# Patient Record
Sex: MALE
[De-identification: names, ages, dates, MRNs, and addresses within clinical notes are randomized per-mention and may not be internally consistent; named-entity substitution may affect disease eponyms.]

---

## 2018-11-22 ENCOUNTER — HOSPITAL ENCOUNTER (EMERGENCY)
Dept: HOSPITAL 88 - FSED | Age: 73
Discharge: HOME | End: 2018-11-22
Payer: MEDICARE

## 2018-11-22 VITALS — WEIGHT: 185 LBS | HEIGHT: 65 IN | BODY MASS INDEX: 30.82 KG/M2

## 2018-11-22 DIAGNOSIS — I10: ICD-10-CM

## 2018-11-22 DIAGNOSIS — S63.125A: ICD-10-CM
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
## 2018-11-22 DIAGNOSIS — S61.012A: Primary | ICD-10-CM

## 2018-11-22 DIAGNOSIS — F17.210: ICD-10-CM

## 2018-11-22 PROCEDURE — 99284 EMERGENCY DEPT VISIT MOD MDM: CPT

## 2018-11-22 NOTE — DIAGNOSTIC IMAGING REPORT
Exam:  Left Hand Series.



History: Laceration to the left thumb.

 

Comparison: None.



Findings: 4 views of the left hand. There is decreased bone mineralization. 

Dislocation of the first finger at the interphalangeal joint, with dorsal

displacement of the distal phalanx. No definite acute, displaced fracture. 

Moderate degenerative changes in the first carpometacarpal joint. Other joint

spaces are relatively preserved.

No lytic or blastic lesion.

No abnormal soft tissue calcification or mass.  No cystic erosive changes.Soft

tissue swelling in the first finger. No soft tissue defect is noted.



Impression:



1. Dislocation of the first finger and the interphalangeal joint, with dorsal

displacement of the distal phalanx. No definite acute displaced fracture. No

soft tissue defect is noted.



2. Findings in the first carpometacarpal joint likely reflect osteoarthritis.



Signed by: Dr. Darrius Logan M.D. on 11/22/2018 3:52 PM

## 2018-11-22 NOTE — DIAGNOSTIC IMAGING REPORT
Exam:  Finger, AP and lateral view



History:  Status post reduction



Comparison: Hand films 11/22/2018



Findings: There is decreased  bone mineralization. Interval reduction of

previously visualized dislocation of the first finger at the interphalangeal

joint, with satisfactory alignment.

There is an oblique linear lucency at the radial aspect of the base of the

distal phalanx, likely representing a nondisplaced fracture. Moderate

degenerative changes in the first carpometacarpal joint. No abnormal soft

tissue calcification or soft tissue defect.  Soft tissue swelling.



Impression:



1. Interval reduction of previously visualized dislocation of the first finger

and the interphalangeal joint, with satisfactory alignment.



2. Oblique linear lucency at the radial aspect of the base of the distal

phalanx likely represents a nondisplaced fracture.



Signed by: Dr. Darrius Logan M.D. on 11/22/2018 4:36 PM

## 2019-10-05 ENCOUNTER — HOSPITAL ENCOUNTER (EMERGENCY)
Dept: HOSPITAL 88 - FSED | Age: 74
Discharge: HOME | End: 2019-10-05
Payer: MEDICARE

## 2019-10-05 VITALS — DIASTOLIC BLOOD PRESSURE: 91 MMHG | SYSTOLIC BLOOD PRESSURE: 159 MMHG

## 2019-10-05 VITALS — HEIGHT: 65 IN | WEIGHT: 170.06 LBS | BODY MASS INDEX: 28.33 KG/M2

## 2019-10-05 DIAGNOSIS — R11.0: ICD-10-CM

## 2019-10-05 DIAGNOSIS — C25.9: ICD-10-CM

## 2019-10-05 DIAGNOSIS — R10.84: ICD-10-CM

## 2019-10-05 DIAGNOSIS — R10.13: Primary | ICD-10-CM

## 2019-10-05 PROCEDURE — 81003 URINALYSIS AUTO W/O SCOPE: CPT

## 2019-10-05 PROCEDURE — 80048 BASIC METABOLIC PNL TOTAL CA: CPT

## 2019-10-05 PROCEDURE — 85025 COMPLETE CBC W/AUTO DIFF WBC: CPT

## 2019-10-05 PROCEDURE — 80076 HEPATIC FUNCTION PANEL: CPT

## 2019-10-05 PROCEDURE — 99284 EMERGENCY DEPT VISIT MOD MDM: CPT

## 2019-10-05 PROCEDURE — 84484 ASSAY OF TROPONIN QUANT: CPT

## 2019-10-05 PROCEDURE — 74177 CT ABD & PELVIS W/CONTRAST: CPT

## 2019-10-05 NOTE — XMS REPORT
Patient Summary Document

                             Created on: 10/05/2019



VIPUL SIMON

External Reference #: [id number]

: 1945

Sex: Male



Demographics







                          Address                   [address]  [address]

 

                          Home Phone                [phone]

 

                          Preferred Language        Unknown

 

                          Marital Status            Unknown

 

                          Catholic Affiliation     Unknown

 

                          Race                      Unknown

 

                          Ethnic Group              Unknown





Author







                          Author                    [facility]

 

                          [facility]

 

                          Address                   Unknown

 

                          Phone                     Unavailable







Care Team Providers







                    Care Team Member Name    Role                Phone

 

                    JEAN CLAUDE ANDERSON     Unavailable         Unavailable







Problems

This patient has no known problems.



Allergies, Adverse Reactions, Alerts

This patient has no known allergies or adverse reactions.



Medications

This patient has no known medications.



Results







           Test Description    Test Time    Test Comments    Text Results    Atomic Results    Result

 Comments

 

                FINGER LT - HOPD    2018 16:27:00                                                          

                                                [place] [address]      Patient Name: VIPUL SIMON                                   MR
#: [id number]                     : 1945                               
   Age/Sex: 73/M  Acct #: [id number]                              Req #: [id number]
[id number]  [facility] Physician:                                                      
Ordered by: VICTOR M ANDERSON MD                            Report #: [id number]   
    Location: [facility]                                    Room/Bed:                 
   
___________________________________________________________________________________________________
   Procedure: [id number] HOPD/FINGER LT - HOPD  Exam Date: 18              
             Exam Time: 1606                                              REPORT
STATUS: Signed       Exam:  Finger, AP and lateral view      History:  Status 
post reduction      Comparison: Hand films 2018      Findings: There is 
decreased  bone mineralization. Interval reduction of   previously visualized 
dislocation of the first finger at the interphalangeal   joint, with 
satisfactory alignment.   There is an oblique linear lucency at the radial 
aspect of the base of the   distal phalanx, likely representing a nondisplaced 
fracture. Moderate   degenerative changes in the first carpometacarpal joint. No
abnormal soft   tissue calcification or soft tissue defect.  Soft tissue 
swelling.      Impression:      1. Interval reduction of previously visualized 
dislocation of the first finger   and the interphalangeal joint, with 
satisfactory alignment.      2. Oblique linear lucency at the radial aspect of 
the base of the distal   phalanx likely represents a nondisplaced fracture.     
Signed by: Dr. Ninoska Logan M.D. on 2018 4:36 PM        Dictated By:
NINOSKA LOGAN MD  Electronically Signed By: NINOSKA LOGAN MD on 18 
Transcribed By: HARI on 18       COPY TO:   VICTOR M ANDERSON MD     
                                         

 

                HAND 3 VIEW [facility]    2018 15:49:00                                                     

                                                     [place] [address]      Patient Name: VIPUL SIMON                                   MR
#: [id number]                     : 1945                               
   Age/Sex: 73/M  Acct #: [id number]                              Req #: [id number]
[id number]  Adm Physician:                                                      
Ordered by: VICTOR M ANDERSON MD                            Report #: [id number]   
    Location: [facility]                                    Room/Bed:                 
   
___________________________________________________________________________________________________
   Procedure: [id number] HOPD/HAND 3 VIEW  - [facility] [place]  Exam Date: 18         
                  Exam Time: 1538                                              
REPORT STATUS: Signed       Exam:  Left Hand Series.      History: Laceration to
the left thumb.       Comparison: None.      Findings: 4 views of the left hand.
There is decreased bone mineralization.    Dislocation of the first finger at 
the interphalangeal joint, with dorsal   displacement of the distal phalanx. No 
definite acute, displaced fracture.    Moderate degenerative changes in the 
first carpometacarpal joint. Other joint   spaces are relatively preserved.   No
lytic or blastic lesion.   No abnormal soft tissue calcification or mass.  No 
cystic erosive changes.Soft   tissue swelling in the first finger. No soft 
tissue defect is noted.      Impression:      1. Dislocation of the first finger
and the interphalangeal joint, with dorsal   displacement of the distal phalanx.
No definite acute displaced fracture. No   soft tissue defect is noted.      2. 
Findings in the first carpometacarpal joint likely reflect osteoarthritis.      
Signed by: Dr. Ninoska Logan M.D. on 2018 3:52 PM        Dictated By:
NINOSKA LOGAN MD  Electronically Signed By: NINOSKA LOGAN MD on 18 
Transcribed By: HARI on 18       COPY TO:   VICTOR M ANDERSON MD

## 2019-10-05 NOTE — DIAGNOSTIC IMAGING REPORT
EXAM: CT Abdomen and Pelvis WITH contrast  

INDICATION:      

^[id number]

^1550    



COMPARISON: None.

TECHNIQUE: Abdomen and pelvis were scanned utilizing a multidetector helical

scanner from the lung base to the pubic symphysis after administration of IV

contrast. Coronal and sagittal reformations were obtained. Routine protocol was

performed. Scan was performed when during portal venous phase.

            IV CONTRAST: 100 mL of Isovue-370

            ORAL CONTRAST: Water

            RADIATION DOSE: Total DLP: 743.7 mGy*cm

             Estimated effective dose: (DLP x 0.015 x size factor) mSv

            COMPLICATIONS: None



FINDINGS:



LINES and TUBES: None.



LOWER THORAX:  Moderate coronary artery calcifications.



HEPATOBILIARY:      No focal hepatic lesions. No biliary ductal dilation. 



GALLBLADDER: Multiple small gallstones.  No wall thickening.



SPLEEN: No splenomegaly. 



PANCREAS: 2.0 x 1.7 cm low-attenuation mass within the pancreatic head on

series 2, image 32. There is severe atrophy of the pancreatic body and tail

with associated diffuse dilatation of the pancreatic duct, measuring up to 0.8

cm in diameter. There is extensive fat stranding surrounding the pancreatic

head better seen on series 2, image 37.  



ADRENALS: No adrenal nodules    



KIDNEYS/URETERS: Kidneys enhance symmetrically.  No hydronephrosis. No cystic

or solid mass lesions.  No stones.



GI TRACT: No abnormal distention, wall thickening, or evidence of bowel

obstruction.  Extensive diverticulosis throughout the sigmoid colon. 

Appendectomy.



PELVIC ORGANS/BLADDER: Unremarkable.



LYMPH NODES: There are few aortocaval (series 2, image 31 and periportal lymph

nodes (series 2, image 26, measuring up to 0.8 cm in transverse diameter.



VESSELS: Atherosclerotic calcifications of the abdominal aorta and pelvic

arteries without aneurysm.



PERITONEUM / RETROPERITONEUM: No free air or fluid. There are multiple soft

tissue masses/implants throughout the abdomen and pelvis with the largest in

the right lower quadrant, underneath the anterior abdominal wall measuring 4.7

x 3.7 cm on series 2, image 53. Additional soft tissue mass is noted in the

left upper quadrant, anteriorly on series 2, image 43 measuring 3.5 x 1.9 cm.

Additional multiple lymph nodes in the right lower quadrant, for example on

series 2 image 56, measuring 2.2 cm.



BONES: Moderate multilevel degenerative changes of the lumbar spine.



SOFT TISSUES: Small fat-containing left inguinal hernia.            



IMPRESSION: 

A 2.0 x 1.7 cm low-attenuation pancreatic head mass with associated diffuse

atrophy of the pancreatic body and tail is highly concerning for malignancy.



Multiple soft tissue masses throughout the abdomen and pelvis with the largest

in the right anterior abdomen, measuring up to 4.7 cm, are indeterminate but

may reflect peritoneal implants.



Recommend GI consultation and MRI abdomen with and without contrast per

pancreatic mass protocol for further evaluation.



Extensive diverticulosis throughout the sigmoid colon without diverticulitis.



Signed by: Dr. Triny Cutler M.D. on 10/5/2019 5:21 PM

## 2020-01-31 ENCOUNTER — HOSPITAL ENCOUNTER (INPATIENT)
Dept: HOSPITAL 88 - FSED | Age: 75
LOS: 3 days | Discharge: HOME | DRG: 375 | End: 2020-02-03
Attending: INTERNAL MEDICINE | Admitting: INTERNAL MEDICINE
Payer: MEDICARE

## 2020-01-31 VITALS — HEIGHT: 65 IN | BODY MASS INDEX: 26.66 KG/M2 | WEIGHT: 160 LBS

## 2020-01-31 VITALS — DIASTOLIC BLOOD PRESSURE: 75 MMHG | SYSTOLIC BLOOD PRESSURE: 134 MMHG

## 2020-01-31 VITALS — SYSTOLIC BLOOD PRESSURE: 139 MMHG | DIASTOLIC BLOOD PRESSURE: 79 MMHG

## 2020-01-31 VITALS — SYSTOLIC BLOOD PRESSURE: 134 MMHG | DIASTOLIC BLOOD PRESSURE: 75 MMHG

## 2020-01-31 DIAGNOSIS — K21.9: ICD-10-CM

## 2020-01-31 DIAGNOSIS — I25.10: ICD-10-CM

## 2020-01-31 DIAGNOSIS — K57.90: ICD-10-CM

## 2020-01-31 DIAGNOSIS — C78.6: Primary | ICD-10-CM

## 2020-01-31 DIAGNOSIS — Z87.891: ICD-10-CM

## 2020-01-31 DIAGNOSIS — C25.9: ICD-10-CM

## 2020-01-31 DIAGNOSIS — R91.1: ICD-10-CM

## 2020-01-31 DIAGNOSIS — K80.20: ICD-10-CM

## 2020-01-31 DIAGNOSIS — K56.690: ICD-10-CM

## 2020-01-31 DIAGNOSIS — I10: ICD-10-CM

## 2020-01-31 PROCEDURE — 81003 URINALYSIS AUTO W/O SCOPE: CPT

## 2020-01-31 PROCEDURE — 96374 THER/PROPH/DIAG INJ IV PUSH: CPT

## 2020-01-31 PROCEDURE — 74177 CT ABD & PELVIS W/CONTRAST: CPT

## 2020-01-31 PROCEDURE — 99284 EMERGENCY DEPT VISIT MOD MDM: CPT

## 2020-01-31 PROCEDURE — 74018 RADEX ABDOMEN 1 VIEW: CPT

## 2020-01-31 PROCEDURE — 36415 COLL VENOUS BLD VENIPUNCTURE: CPT

## 2020-01-31 PROCEDURE — 80076 HEPATIC FUNCTION PANEL: CPT

## 2020-01-31 PROCEDURE — 83735 ASSAY OF MAGNESIUM: CPT

## 2020-01-31 PROCEDURE — 96375 TX/PRO/DX INJ NEW DRUG ADDON: CPT

## 2020-01-31 PROCEDURE — 83690 ASSAY OF LIPASE: CPT

## 2020-01-31 PROCEDURE — 85025 COMPLETE CBC W/AUTO DIFF WBC: CPT

## 2020-01-31 PROCEDURE — 80048 BASIC METABOLIC PNL TOTAL CA: CPT

## 2020-01-31 PROCEDURE — 84100 ASSAY OF PHOSPHORUS: CPT

## 2020-01-31 RX ADMIN — SODIUM CHLORIDE SCH MLS/HR: 9 INJECTION, SOLUTION INTRAVENOUS at 18:31

## 2020-01-31 RX ADMIN — SODIUM CHLORIDE PRN MG: 900 INJECTION INTRAVENOUS at 21:42

## 2020-01-31 NOTE — NUR
PT ARRIVED TO ROOM 203 WITH EMS VIA STRETCHER; PT AWAKE, ALERT, NO SIGNS OF DISTRESS, IV 
PATENT, INTACT, NG TUBE IN PLACE. PT HAS NO COMPLAINTS AT THIS TIME.

## 2020-01-31 NOTE — DIAGNOSTIC IMAGING REPORT
CT scan of the abdomen and pelvis.



Medical history: Pain, pancreatic cancer.



Comparison study: October 5, 2019.



Technique: Contiguous helical slices were acquired through the abdomen and

pelvis post administration of intravenous contrast. No oral contrast was

administered. This exam was performed according to our department dose

optimization program which includes automated exposure control, adjustment of

the mA and/or kV according to the patient's size and/or use of iterative

reconstruction technique.



Findings: A 6 mm nodule is seen in the right middle lobe on image 1, partially

visualized and for which metastatic disease cannot be excluded. Atelectasis or

fibrosis is seen in the lung bases.



The liver, spleen, adrenal glands and kidneys are unremarkable. Cholelithiasis

is seen with distention of the gallbladder measuring 4.2 cm in maximal

transverse diameter. No biliary dilatation is seen. The portal vein remains

patent measuring 1.5 cm.



A 2.2 x 2.3 cm mass is seen in the pancreatic head, similar to previous. There

are atrophic changes in the tail with significant dilatation of the duct

measuring up to 7 mm. The mass invades the superior mesenteric vein with near

complete occlusion. The portal venous confluence appears invaded. The lesion

abuts the superior mesenteric artery with no clear invasion.



There are multiple dilated small bowel loops measuring up to 4.0 cm consistent

with a small bowel obstruction. A transition is seen in the mid small bowel

slightly eccentric to the right side. The small bowel distal to this region is

decompressed. Stool and gas remaining in the colon. Extensive diverticulosis is

seen without evidence of diverticulitis. Mild ascites is seen, more pronounced

on previous.



As on previous, there is evidence of omental taking with multiple large omental

masses identified measuring up to 7.2 x 1.5 cm in maximal transverse diameter.

This nodule is slightly less pronounced on previous. However, other omental

sites extending to the mesentery are more prominent including a 5.7 x 2.3 cm

focus which was not previously present.



There is no free fluid or free air. The aorta is normal in caliber.

Atherosclerosis is identified. A duodenal diverticulum is noted.



Bone windows demonstrate degenerative changes.



Impression:

1. Pancreatic adenocarcinoma, as on previous. It invades several adjacent

structures.

2. Omental take as well as mesenteric lesions, some areas appear more

pronounced whereas others are less pronounced.

3. Small bowel obstruction with a transition in the mid small bowel to the

right of midline. Obstruction from a mesenteric implant cannot be excluded.

4. Diverticulosis.

5. Cholelithiasis. 

6. Tiny nodule in the right middle lobe, partially visualized and for which

metastatic disease cannot be excluded.



Signed by: Luis Owusu MD on 1/31/2020 3:45 PM

## 2020-01-31 NOTE — NUR
BEDSIDE SHIFT REPORT GIVEN TO NIGHT SHIFT RN; PT'S NG TUBE PATENT, INTACT. PT AWAKE, ALERT, 
NO SIGNS OF DISTRESS.

## 2020-02-01 VITALS — DIASTOLIC BLOOD PRESSURE: 78 MMHG | SYSTOLIC BLOOD PRESSURE: 144 MMHG

## 2020-02-01 VITALS — DIASTOLIC BLOOD PRESSURE: 72 MMHG | SYSTOLIC BLOOD PRESSURE: 120 MMHG

## 2020-02-01 VITALS — SYSTOLIC BLOOD PRESSURE: 123 MMHG | DIASTOLIC BLOOD PRESSURE: 66 MMHG

## 2020-02-01 VITALS — SYSTOLIC BLOOD PRESSURE: 115 MMHG | DIASTOLIC BLOOD PRESSURE: 66 MMHG

## 2020-02-01 VITALS — DIASTOLIC BLOOD PRESSURE: 63 MMHG | SYSTOLIC BLOOD PRESSURE: 121 MMHG

## 2020-02-01 VITALS — DIASTOLIC BLOOD PRESSURE: 65 MMHG | SYSTOLIC BLOOD PRESSURE: 119 MMHG

## 2020-02-01 VITALS — DIASTOLIC BLOOD PRESSURE: 66 MMHG | SYSTOLIC BLOOD PRESSURE: 115 MMHG

## 2020-02-01 LAB
ANION GAP SERPL CALC-SCNC: 16.5 MMOL/L (ref 8–16)
BASOPHILS # BLD AUTO: 0 10*3/UL (ref 0–0.1)
BASOPHILS NFR BLD AUTO: 0.3 % (ref 0–1)
BUN SERPL-MCNC: 16 MG/DL (ref 7–26)
BUN/CREAT SERPL: 22 (ref 6–25)
CALCIUM SERPL-MCNC: 8.2 MG/DL (ref 8.4–10.2)
CHLORIDE SERPL-SCNC: 103 MMOL/L (ref 98–107)
CO2 SERPL-SCNC: 20 MMOL/L (ref 22–29)
DEPRECATED NEUTROPHILS # BLD AUTO: 2.8 10*3/UL (ref 2.1–6.9)
EGFRCR SERPLBLD CKD-EPI 2021: > 60 ML/MIN (ref 60–?)
EOSINOPHIL # BLD AUTO: 0 10*3/UL (ref 0–0.4)
EOSINOPHIL NFR BLD AUTO: 0 % (ref 0–6)
ERYTHROCYTE [DISTWIDTH] IN CORD BLOOD: 14.6 % (ref 11.7–14.4)
GLUCOSE SERPLBLD-MCNC: 109 MG/DL (ref 74–118)
HCT VFR BLD AUTO: 33.2 % (ref 38.2–49.6)
HGB BLD-MCNC: 11.2 G/DL (ref 14–18)
LYMPHOCYTES # BLD: 0.6 10*3/UL (ref 1–3.2)
LYMPHOCYTES NFR BLD AUTO: 14.5 % (ref 18–39.1)
MCH RBC QN AUTO: 31.5 PG (ref 28–32)
MCHC RBC AUTO-ENTMCNC: 33.7 G/DL (ref 31–35)
MCV RBC AUTO: 93.5 FL (ref 81–99)
MONOCYTES # BLD AUTO: 0.4 10*3/UL (ref 0.2–0.8)
MONOCYTES NFR BLD AUTO: 11.6 % (ref 4.4–11.3)
NEUTS SEG NFR BLD AUTO: 73.3 % (ref 38.7–80)
PLATELET # BLD AUTO: 211 X10E3/UL (ref 140–360)
POTASSIUM SERPL-SCNC: 3.5 MMOL/L (ref 3.5–5.1)
RBC # BLD AUTO: 3.55 X10E6/UL (ref 4.3–5.7)
SODIUM SERPL-SCNC: 136 MMOL/L (ref 136–145)

## 2020-02-01 RX ADMIN — FAMOTIDINE SCH MG: 10 INJECTION, SOLUTION INTRAVENOUS at 16:16

## 2020-02-01 RX ADMIN — SODIUM CHLORIDE SCH MLS/HR: 9 INJECTION, SOLUTION INTRAVENOUS at 03:00

## 2020-02-01 RX ADMIN — SODIUM CHLORIDE SCH MLS/HR: 9 INJECTION, SOLUTION INTRAVENOUS at 08:14

## 2020-02-01 RX ADMIN — FAMOTIDINE SCH MG: 10 INJECTION, SOLUTION INTRAVENOUS at 09:28

## 2020-02-01 RX ADMIN — SODIUM CHLORIDE SCH MLS/HR: 9 INJECTION, SOLUTION INTRAVENOUS at 16:16

## 2020-02-01 RX ADMIN — SODIUM CHLORIDE PRN MG: 900 INJECTION INTRAVENOUS at 06:04

## 2020-02-01 NOTE — CONSULTATION
DATE OF CONSULTATION:  02/01/2020  

 

CHIEF COMPLAINT:  Vomiting.

 

HISTORY OF PRESENT ILLNESS:  The patient is a 74-year-old male with known history of

advanced pancreatic cancer, undergoing chemotherapy.  He complains of 1-week history of

intolerance of oral intake with repeated nausea and vomiting, and some abdominal

discomfort.  No fever or diarrhea.  Last bowel movement was yesterday. 

 

PAST MEDICAL HISTORY:  Significant for hypertension, coronary artery disease, and

gastroesophageal reflux disease. 

 

PAST SURGICAL HISTORY:  Positive for gastric surgery and appendectomy.

 

ALLERGIES:  TO PENICILLIN.

 

SOCIAL HABITS:  He denies smoking or alcohol use.

 

REVIEW OF SYSTEMS:

No chest pain, shortness of breath, or cough.

 

PHYSICAL EXAMINATION:

VITAL SIGNS:  Stable.  He is afebrile.  He is awake, alert, and mild discomfort. 

HEENT:  Sclerae nonicteric. 

NECK:  Supple. 

LUNGS:  Clear. 

HEART:  Regular rate and rhythm. 

ABDOMEN:  Soft.  No guarding.  No focal tenderness or rebound. 

EXTREMITIES:  No cyanosis or edema.

LABORATORY DATA:  The patient white cell count is 4, hemoglobin of 11, creatinine 0.7.

Lipase is less than 4.  CT of the abdomen show evidence of intestinal obstruction with

transition in the mid small bowel. 

 

ASSESSMENT:  Intestinal obstruction in patient with advanced pancreatic carcinoma with

omental implants, possibly resulting in obstruction.  Planned NG tube to wall suction.

Serial abdominal exam and x-ray.  The patient may need intestinal bypass if obstruction

persists. 

 

 

 

 

______________________________

Abraham Alvarez MD

 

DNMARQUIS/MODL

D:  02/01/2020 11:22:28

T:  02/01/2020 17:35:31

Job #:  [id number]

## 2020-02-01 NOTE — NUR
BEDSIDE SHIFT REPORT RECEIVED FROM THE NIGHT SHIFT RN. EDUCATED PT ABOUT FALL PRECAUTIONS. 
CALL LIGHT WITH IN EASY REACH. INSTRUCTED PT TO USE CALL LIGHT FOR ALL THE NEEDS. PT 
VERBALIZED UNDERSTANDING. DAUGHTER AT BEDSIDE. BED IS LOW AND LOCKED. SIDE RAILS X2. PT 
DENIES NEEDS AT THIS TIME.

## 2020-02-01 NOTE — NUR
H&P



cc: abdominal pain



HPI: 74yoM, PCP , Onc , developed abdominal pain and N/V.  
Last BM this am, normal.  



PMH: Metastatic pancreatic adenocarcinoma dx Fall 2019 s/p chemo ongoing, HTN, 
GERD, Mood d/o, hx cigarettes

PShx: appendectomy, back, face, hand

Allergies; see emr

FH/Sh; ; quit cig in Dec 2019

Meds; see MAR

ROS: no f/c/s/VIERA/cp/confusion/focal limb weakness/dizziness/vision changes



v/s revd

PE

tired appearing

NGT in place; facial asymmetry

ns1s2

mod bs

soft; mild tender in mid abdomen

no e/t

skin dry

flat affect

a&ox3; moe



labs/med revd



A/P: 74yoM

Pancreastic adenocarcinoma

SBO

Diverticulosis

Cholelithiasis

Right lung nodule

GERD

HTN

FOrmer smoker



PLAN

IVF; NPO; antiemetics; NGT; Sx consult; GI eval; 

IV ppi and SCD; 



Lane aHrdin MD, PhD.

## 2020-02-01 NOTE — DIAGNOSTIC IMAGING REPORT
Exam:Abdominal radiograph



History:Small bowel obstruction



Comparison: None available



Findings:



Dilated loops of small bowel measuring up to 4.5 cm. No visualized free air.

Contrast within the bladder.





Impression:



Persistent small bowel obstruction



Signed by: Dr. Andrew Palisch, M.D. on 2/1/2020 12:47 PM

## 2020-02-02 VITALS — SYSTOLIC BLOOD PRESSURE: 138 MMHG | DIASTOLIC BLOOD PRESSURE: 77 MMHG

## 2020-02-02 VITALS — DIASTOLIC BLOOD PRESSURE: 75 MMHG | SYSTOLIC BLOOD PRESSURE: 130 MMHG

## 2020-02-02 VITALS — SYSTOLIC BLOOD PRESSURE: 128 MMHG | DIASTOLIC BLOOD PRESSURE: 65 MMHG

## 2020-02-02 VITALS — SYSTOLIC BLOOD PRESSURE: 154 MMHG | DIASTOLIC BLOOD PRESSURE: 84 MMHG

## 2020-02-02 VITALS — DIASTOLIC BLOOD PRESSURE: 77 MMHG | SYSTOLIC BLOOD PRESSURE: 138 MMHG

## 2020-02-02 VITALS — SYSTOLIC BLOOD PRESSURE: 130 MMHG | DIASTOLIC BLOOD PRESSURE: 75 MMHG

## 2020-02-02 VITALS — SYSTOLIC BLOOD PRESSURE: 142 MMHG | DIASTOLIC BLOOD PRESSURE: 68 MMHG

## 2020-02-02 VITALS — SYSTOLIC BLOOD PRESSURE: 136 MMHG | DIASTOLIC BLOOD PRESSURE: 72 MMHG

## 2020-02-02 LAB
ANION GAP SERPL CALC-SCNC: 16.5 MMOL/L (ref 8–16)
BASOPHILS # BLD AUTO: 0 10*3/UL (ref 0–0.1)
BASOPHILS NFR BLD AUTO: 0.4 % (ref 0–1)
BUN SERPL-MCNC: 15 MG/DL (ref 7–26)
BUN/CREAT SERPL: 22 (ref 6–25)
CALCIUM SERPL-MCNC: 7.9 MG/DL (ref 8.4–10.2)
CHLORIDE SERPL-SCNC: 105 MMOL/L (ref 98–107)
CO2 SERPL-SCNC: 19 MMOL/L (ref 22–29)
DEPRECATED NEUTROPHILS # BLD AUTO: 2.9 10*3/UL (ref 2.1–6.9)
DEPRECATED PHOSPHATE SERPL-MCNC: 2.2 MG/DL (ref 2.3–4.7)
EGFRCR SERPLBLD CKD-EPI 2021: > 60 ML/MIN (ref 60–?)
EOSINOPHIL # BLD AUTO: 0.1 10*3/UL (ref 0–0.4)
EOSINOPHIL NFR BLD AUTO: 1.6 % (ref 0–6)
EOSINOPHIL NFR BLD MANUAL: 2 % (ref 0–7)
ERYTHROCYTE [DISTWIDTH] IN CORD BLOOD: 14.7 % (ref 11.7–14.4)
GLUCOSE SERPLBLD-MCNC: 72 MG/DL (ref 74–118)
HCT VFR BLD AUTO: 30.6 % (ref 38.2–49.6)
HGB BLD-MCNC: 10 G/DL (ref 14–18)
LYMPHOCYTES # BLD: 1.2 10*3/UL (ref 1–3.2)
LYMPHOCYTES NFR BLD AUTO: 23.4 % (ref 18–39.1)
LYMPHOCYTES NFR BLD MANUAL: 30 % (ref 19–48)
MAGNESIUM SERPL-MCNC: 1.6 MG/DL (ref 1.3–2.1)
MCH RBC QN AUTO: 31.3 PG (ref 28–32)
MCHC RBC AUTO-ENTMCNC: 32.7 G/DL (ref 31–35)
MCV RBC AUTO: 95.6 FL (ref 81–99)
MONOCYTES # BLD AUTO: 0.8 10*3/UL (ref 0.2–0.8)
MONOCYTES NFR BLD AUTO: 16.3 % (ref 4.4–11.3)
MONOCYTES NFR BLD MANUAL: 11 % (ref 3.4–9)
NEUTS BAND NFR BLD MANUAL: 7 %
NEUTS SEG NFR BLD AUTO: 58.1 % (ref 38.7–80)
NEUTS SEG NFR BLD MANUAL: 48 % (ref 40–74)
PLAT MORPH BLD: NORMAL
PLATELET # BLD AUTO: 208 X10E3/UL (ref 140–360)
PLATELET # BLD EST: ADEQUATE 10*3/UL
POTASSIUM SERPL-SCNC: 3.5 MMOL/L (ref 3.5–5.1)
RBC # BLD AUTO: 3.2 X10E6/UL (ref 4.3–5.7)
RBC MORPH BLD: NORMAL
SODIUM SERPL-SCNC: 137 MMOL/L (ref 136–145)

## 2020-02-02 RX ADMIN — SODIUM CHLORIDE SCH MLS/HR: 9 INJECTION, SOLUTION INTRAVENOUS at 02:00

## 2020-02-02 RX ADMIN — FAMOTIDINE SCH MG: 10 INJECTION, SOLUTION INTRAVENOUS at 08:51

## 2020-02-02 RX ADMIN — SODIUM CHLORIDE SCH MLS/HR: 9 INJECTION, SOLUTION INTRAVENOUS at 17:16

## 2020-02-02 RX ADMIN — SODIUM CHLORIDE SCH MLS/HR: 9 INJECTION, SOLUTION INTRAVENOUS at 08:51

## 2020-02-02 RX ADMIN — FAMOTIDINE SCH MG: 10 INJECTION, SOLUTION INTRAVENOUS at 17:16

## 2020-02-02 NOTE — NUR
IM- progress note

O/N see below

ROS: no f/c/s/HA/cp/confusion/focal limb weakness/dizziness/vision changes



v/s revd

PE

tired appearing

NGT in place; facial asymmetry

ns1s2

mod bs

soft; mild tender in mid abdomen

no e/t

skin dry

flat affect

a&ox3; moe



labs/med revd



A/P: 74yoM

Stage 4 Pancreatic adenocarcinoma with peritoneal implants

SBO

Diverticulosis

Cholelithiasis

Right lung nodule

GERD

HTN

FOrmer smoker



PLAN

IVF; NPO; antiemetics; NGT; Sx consult; GI eval; 

IV ppi and SCD; 



2/2 check labs; f/u XR this am.



Lane Hardin MD, PhD.

## 2020-02-02 NOTE — PROGRESS NOTE
DATE:  02/02/2020

 

Hematology Oncology Progress Note

 

This is coverage for Dr. Shahla Nash.

 

SUBJECTIVE:  Mr. White was seen and examined at bedside.  The patient had his NG tube

taken out.  He ate clear liquid diet and he tolerated.  He walked around the room

without difficulty.  He feels much better.  He says his abdomen is softer. 

 

REVIEW OF SYSTEMS:

No headaches, no bleeding.

 

OBJECTIVE:  VITAL SIGNS:  Afebrile, vital signs noted per the chart record. 

GENERAL:  In no acute distress.  Alert and calm. 

HEENT:  Normocephalic and atraumatic. 

NECK:  Supple.  Throat midline. 

LUNGS:  Bilateral air entry, rare rhonchi. 

CARDIOVASCULAR:  S1, S2.  No murmurs, rubs, or gallops. 

ABDOMEN:  Soft and nontender. 

EXTREMITIES:  No clubbing.  No cyanosis.  There is no edema. 

INTEGUMENT:  No rash.  No purpura.

 

LABORATORY DATA:  BUN 15, creatinine 0.7.  White count 5, hematocrit 31 .

 

IMPRESSION AND PLAN:  

1. Stage IV pancreatic adenocarcinoma, peritoneal metastases.

2. Admit with small-bowel obstruction, partial.

3. Diverticulosis.

4. Cholelithiasis.

5. Right lung nodule.

6. Gastroesophageal reflux disease.

7. Hypertension.

8. Former smoker.

9. Mild anemia.   

 

The patient continues to have up-regulation his diet by surgeon.  Diet explanation

today.  If the patient is discharged tomorrow, he is tentatively planned for

chemotherapy tomorrow, Dr. Nash.  We will follow up his clinical progress. 

 

 

 

 

______________________________

MD ADRIANNE Lanza/MICHAEL

D:  02/02/2020 16:42:30

T:  02/02/2020 20:19:26

Job #:  [id number]

## 2020-02-02 NOTE — DIAGNOSTIC IMAGING REPORT
EXAM: Abdomen Radiograph 1  View(s)

INDICATION:     

^bowel obstruction

^[id number]

^0630

^Y

COMPARISON: CT abdomen pelvis dated 1/31/2020



FINDINGS:



No abnormalities in the lower chest.



There is NG tube in place with distal tip in stomach and sidehole at the level

of GE junction.



Normal volume of stool in the colon.



No dilated loops of small bowel.



No abnormal abdominal calcifications..



No abnormal soft tissue masses.



No pneumoperitoneum.



No acute osseous abnormality. Marked degenerative changes seen in the spine.



IMPRESSION:



No radiographic evidence of obstruction.



There is NG tube in place with distal tip in stomach and sidehole at the level

of GE junction. Recommend 5 cm advancement.



Signed by: Luis Harper MD on 2/2/2020 8:01 AM

## 2020-02-03 VITALS — SYSTOLIC BLOOD PRESSURE: 137 MMHG | DIASTOLIC BLOOD PRESSURE: 80 MMHG

## 2020-02-03 VITALS — DIASTOLIC BLOOD PRESSURE: 76 MMHG | SYSTOLIC BLOOD PRESSURE: 138 MMHG

## 2020-02-03 VITALS — DIASTOLIC BLOOD PRESSURE: 80 MMHG | SYSTOLIC BLOOD PRESSURE: 137 MMHG

## 2020-02-03 VITALS — DIASTOLIC BLOOD PRESSURE: 76 MMHG | SYSTOLIC BLOOD PRESSURE: 132 MMHG

## 2020-02-03 LAB
ANION GAP SERPL CALC-SCNC: 13.6 MMOL/L (ref 8–16)
BUN SERPL-MCNC: 10 MG/DL (ref 7–26)
BUN/CREAT SERPL: 17 (ref 6–25)
CALCIUM SERPL-MCNC: 8.1 MG/DL (ref 8.4–10.2)
CHLORIDE SERPL-SCNC: 100 MMOL/L (ref 98–107)
CO2 SERPL-SCNC: 22 MMOL/L (ref 22–29)
DEPRECATED PHOSPHATE SERPL-MCNC: 2.1 MG/DL (ref 2.3–4.7)
EGFRCR SERPLBLD CKD-EPI 2021: > 60 ML/MIN (ref 60–?)
GLUCOSE SERPLBLD-MCNC: 87 MG/DL (ref 74–118)
MAGNESIUM SERPL-MCNC: 1.5 MG/DL (ref 1.3–2.1)
POTASSIUM SERPL-SCNC: 3.6 MMOL/L (ref 3.5–5.1)
SODIUM SERPL-SCNC: 132 MMOL/L (ref 136–145)

## 2020-02-03 RX ADMIN — SODIUM CHLORIDE SCH MLS/HR: 9 INJECTION, SOLUTION INTRAVENOUS at 00:14

## 2020-02-03 RX ADMIN — FAMOTIDINE SCH MG: 10 INJECTION, SOLUTION INTRAVENOUS at 08:30

## 2020-02-03 RX ADMIN — SODIUM CHLORIDE SCH MLS/HR: 9 INJECTION, SOLUTION INTRAVENOUS at 08:14

## 2020-02-03 NOTE — NUR
D/C summary



Principal Dx:

Stage 4 Pancreatic adenocarcinoma with peritoneal implants

SBO



secondary Dx:

Diverticulosis

Cholelithiasis

Right lung nodule

GERD

HTN

FOrmer smoker



PLAN

IVF; NPO; antiemetics; NGT; Sx consult; GI eval; 

IV ppi and SCD; 



2/2 check labs; f/u XR this am.

2-3 XR shows improvement; diet per surgery; check lytes; 

Better; d/c home



d/c home

f/u pcp 1 week

stable

d/c>35mins



Lane Hardin MD, PhD.

## 2020-02-09 ENCOUNTER — HOSPITAL ENCOUNTER (INPATIENT)
Dept: HOSPITAL 88 - ER | Age: 75
LOS: 18 days | Discharge: NURSING, REHAB OR LONG-TERM CARE | DRG: 330 | End: 2020-02-27
Attending: INTERNAL MEDICINE | Admitting: INTERNAL MEDICINE
Payer: MEDICARE

## 2020-02-09 VITALS — BODY MASS INDEX: 33.47 KG/M2 | HEIGHT: 60.5 IN | WEIGHT: 175 LBS

## 2020-02-09 VITALS — DIASTOLIC BLOOD PRESSURE: 89 MMHG | SYSTOLIC BLOOD PRESSURE: 129 MMHG

## 2020-02-09 DIAGNOSIS — K56.699: ICD-10-CM

## 2020-02-09 DIAGNOSIS — K80.20: ICD-10-CM

## 2020-02-09 DIAGNOSIS — E44.0: ICD-10-CM

## 2020-02-09 DIAGNOSIS — K57.90: ICD-10-CM

## 2020-02-09 DIAGNOSIS — K21.9: ICD-10-CM

## 2020-02-09 DIAGNOSIS — D63.8: ICD-10-CM

## 2020-02-09 DIAGNOSIS — R50.82: ICD-10-CM

## 2020-02-09 DIAGNOSIS — Z87.891: ICD-10-CM

## 2020-02-09 DIAGNOSIS — I10: ICD-10-CM

## 2020-02-09 DIAGNOSIS — C25.9: ICD-10-CM

## 2020-02-09 DIAGNOSIS — C78.6: Primary | ICD-10-CM

## 2020-02-09 DIAGNOSIS — R91.1: ICD-10-CM

## 2020-02-09 LAB
ALBUMIN SERPL-MCNC: 3.5 G/DL (ref 3.5–5)
ALBUMIN/GLOB SERPL: 0.9 {RATIO} (ref 0.8–2)
ALP SERPL-CCNC: 96 IU/L (ref 40–150)
ALT SERPL-CCNC: 13 IU/L (ref 0–55)
ANION GAP SERPL CALC-SCNC: 17.7 MMOL/L (ref 8–16)
BASOPHILS # BLD AUTO: 0.1 10*3/UL (ref 0–0.1)
BASOPHILS NFR BLD AUTO: 0.4 % (ref 0–1)
BUN SERPL-MCNC: 14 MG/DL (ref 7–26)
BUN/CREAT SERPL: 15 (ref 6–25)
CALCIUM SERPL-MCNC: 9.4 MG/DL (ref 8.4–10.2)
CHLORIDE SERPL-SCNC: 94 MMOL/L (ref 98–107)
CK MB SERPL-MCNC: 1.1 NG/ML (ref 0–5)
CK SERPL-CCNC: 33 IU/L (ref 30–200)
CO2 SERPL-SCNC: 27 MMOL/L (ref 22–29)
DEPRECATED NEUTROPHILS # BLD AUTO: 9.5 10*3/UL (ref 2.1–6.9)
EGFRCR SERPLBLD CKD-EPI 2021: > 60 ML/MIN (ref 60–?)
EOSINOPHIL # BLD AUTO: 0 10*3/UL (ref 0–0.4)
EOSINOPHIL NFR BLD AUTO: 0.3 % (ref 0–6)
ERYTHROCYTE [DISTWIDTH] IN CORD BLOOD: 14.9 % (ref 11.7–14.4)
GLOBULIN PLAS-MCNC: 4.1 G/DL (ref 2.3–3.5)
GLUCOSE SERPLBLD-MCNC: 115 MG/DL (ref 74–118)
HCT VFR BLD AUTO: 42.4 % (ref 38.2–49.6)
HGB BLD-MCNC: 14.6 G/DL (ref 14–18)
LIPASE SERPL-CCNC: < 4 U/L (ref 8–78)
LYMPHOCYTES # BLD: 1.9 10*3/UL (ref 1–3.2)
LYMPHOCYTES NFR BLD AUTO: 15.1 % (ref 18–39.1)
MCH RBC QN AUTO: 31.4 PG (ref 28–32)
MCHC RBC AUTO-ENTMCNC: 34.4 G/DL (ref 31–35)
MCV RBC AUTO: 91.2 FL (ref 81–99)
MONOCYTES # BLD AUTO: 1.1 10*3/UL (ref 0.2–0.8)
MONOCYTES NFR BLD AUTO: 8.8 % (ref 4.4–11.3)
NEUTS SEG NFR BLD AUTO: 74.9 % (ref 38.7–80)
PLATELET # BLD AUTO: 626 X10E3/UL (ref 140–360)
POTASSIUM SERPL-SCNC: 3.7 MMOL/L (ref 3.5–5.1)
RBC # BLD AUTO: 4.65 X10E6/UL (ref 4.3–5.7)
SODIUM SERPL-SCNC: 135 MMOL/L (ref 136–145)

## 2020-02-09 PROCEDURE — 84100 ASSAY OF PHOSPHORUS: CPT

## 2020-02-09 PROCEDURE — 74019 RADEX ABDOMEN 2 VIEWS: CPT

## 2020-02-09 PROCEDURE — 77001 FLUOROGUIDE FOR VEIN DEVICE: CPT

## 2020-02-09 PROCEDURE — 36556 INSERT NON-TUNNEL CV CATH: CPT

## 2020-02-09 PROCEDURE — 74176 CT ABD & PELVIS W/O CONTRAST: CPT

## 2020-02-09 PROCEDURE — 96361 HYDRATE IV INFUSION ADD-ON: CPT

## 2020-02-09 PROCEDURE — 82948 REAGENT STRIP/BLOOD GLUCOSE: CPT

## 2020-02-09 PROCEDURE — 88112 CYTOPATH CELL ENHANCE TECH: CPT

## 2020-02-09 PROCEDURE — 82248 BILIRUBIN DIRECT: CPT

## 2020-02-09 PROCEDURE — 83605 ASSAY OF LACTIC ACID: CPT

## 2020-02-09 PROCEDURE — 96366 THER/PROPH/DIAG IV INF ADDON: CPT

## 2020-02-09 PROCEDURE — 78227 HEPATOBIL SYST IMAGE W/DRUG: CPT

## 2020-02-09 PROCEDURE — 80048 BASIC METABOLIC PNL TOTAL CA: CPT

## 2020-02-09 PROCEDURE — 99284 EMERGENCY DEPT VISIT MOD MDM: CPT

## 2020-02-09 PROCEDURE — 83735 ASSAY OF MAGNESIUM: CPT

## 2020-02-09 PROCEDURE — 85610 PROTHROMBIN TIME: CPT

## 2020-02-09 PROCEDURE — 85025 COMPLETE CBC W/AUTO DIFF WBC: CPT

## 2020-02-09 PROCEDURE — 80053 COMPREHEN METABOLIC PANEL: CPT

## 2020-02-09 PROCEDURE — 84484 ASSAY OF TROPONIN QUANT: CPT

## 2020-02-09 PROCEDURE — 97139 UNLISTED THERAPEUTIC PX: CPT

## 2020-02-09 PROCEDURE — 84478 ASSAY OF TRIGLYCERIDES: CPT

## 2020-02-09 PROCEDURE — 74470 X-RAY EXAM OF KIDNEY LESION: CPT

## 2020-02-09 PROCEDURE — 74018 RADEX ABDOMEN 1 VIEW: CPT

## 2020-02-09 PROCEDURE — 84134 ASSAY OF PREALBUMIN: CPT

## 2020-02-09 PROCEDURE — 87040 BLOOD CULTURE FOR BACTERIA: CPT

## 2020-02-09 PROCEDURE — 82553 CREATINE MB FRACTION: CPT

## 2020-02-09 PROCEDURE — 80202 ASSAY OF VANCOMYCIN: CPT

## 2020-02-09 PROCEDURE — 84295 ASSAY OF SERUM SODIUM: CPT

## 2020-02-09 PROCEDURE — 93306 TTE W/DOPPLER COMPLETE: CPT

## 2020-02-09 PROCEDURE — 71045 X-RAY EXAM CHEST 1 VIEW: CPT

## 2020-02-09 PROCEDURE — 84630 ASSAY OF ZINC: CPT

## 2020-02-09 PROCEDURE — 76937 US GUIDE VASCULAR ACCESS: CPT

## 2020-02-09 PROCEDURE — 36415 COLL VENOUS BLD VENIPUNCTURE: CPT

## 2020-02-09 PROCEDURE — 82550 ASSAY OF CK (CPK): CPT

## 2020-02-09 PROCEDURE — 93005 ELECTROCARDIOGRAM TRACING: CPT

## 2020-02-09 PROCEDURE — 85730 THROMBOPLASTIN TIME PARTIAL: CPT

## 2020-02-09 PROCEDURE — 88305 TISSUE EXAM BY PATHOLOGIST: CPT

## 2020-02-09 PROCEDURE — 85014 HEMATOCRIT: CPT

## 2020-02-09 PROCEDURE — 85018 HEMOGLOBIN: CPT

## 2020-02-09 PROCEDURE — 83690 ASSAY OF LIPASE: CPT

## 2020-02-09 RX ADMIN — SODIUM CHLORIDE SCH ML: 900 IRRIGANT IRRIGATION at 15:21

## 2020-02-09 RX ADMIN — SODIUM CHLORIDE PRN MG: 900 INJECTION INTRAVENOUS at 19:15

## 2020-02-09 RX ADMIN — SODIUM CHLORIDE SCH ML: 900 IRRIGANT IRRIGATION at 19:15

## 2020-02-09 RX ADMIN — PROMETHAZINE HYDROCHLORIDE PRN MG: 25 INJECTION, SOLUTION INTRAMUSCULAR; INTRAVENOUS at 20:25

## 2020-02-09 RX ADMIN — SODIUM CHLORIDE SCH ML: 900 IRRIGANT IRRIGATION at 22:30

## 2020-02-09 RX ADMIN — METOPROLOL TARTRATE PRN MG: 1 INJECTION, SOLUTION INTRAVENOUS at 20:28

## 2020-02-09 NOTE — NUR
Received patient from ER nurse, patient is stable at this time. safety and fall precautions 
maintained as per hospital protocol: bed in lowest position and locked, needed items beside 
bed and patient instructed to call at all times for help.

## 2020-02-09 NOTE — NUR
H&P



cc: abdominal pain



HPI: 74yoM, PCP , Onc , developed abdominal pain and N/V.  
Recently d/c after SBO that spontaneously resolved with conservative tx.    



PMH: Metastatic pancreatic adenocarcinoma dx Fall 2019 s/p chemo ongoing, HTN, 
GERD, Mood d/o, hx cigarettes, SBO 2/2020

PShx: appendectomy, back, face, hand

Allergies; see emr

FH/Sh; ; quit cig in Dec 2019

Meds; see MAR

ROS: no f/c/s/VIERA/cp/confusion/focal limb weakness/dizziness/vision changes



v/s revd

PE

tired appearing

facial asymmetry

ns1s2

mod bs

soft; mild tender in mid abdomen

no e/t

skin dry

flat affect

a&ox3; moe



labs/med revd



A/P: 74yoM

SBO

Pancreastic adenocarcinoma

Diverticulosis

Cholelithiasis

Right lung nodule

GERD

HTN

FOrmer smoker



PLAN

Place NGT; IVF; NPO; antiemetics; NGT; Sx consult; GI eval; 

IV ppi and SCD; 



Lane Hardin MD, PhD.

## 2020-02-09 NOTE — DIAGNOSTIC IMAGING REPORT
Exam: KUB - 2 views



Clinical History: Query small bowel obstruction.



Comparison: KUB 2/2/2020. CT abdomen/pelvis 1/31/2020.



Findings: 

Interval removal of enteric tube. Dilated small bowel loop in the upper

abdomen, measuring up to 5.2 cm. There are multiple air-fluid levels on upright

view. Some air is seen throughout the colon and rectum, which is not distended.

No evidence of free intraperitoneal air. 



No acute osseous abnormality. Cholelithiasis.



Impression:

Findings of partial small bowel obstruction.



Signed by: Dr. Julio Cesar Day MD on 2/9/2020 1:04 PM

## 2020-02-10 VITALS — SYSTOLIC BLOOD PRESSURE: 124 MMHG | DIASTOLIC BLOOD PRESSURE: 84 MMHG

## 2020-02-10 VITALS — SYSTOLIC BLOOD PRESSURE: 131 MMHG | DIASTOLIC BLOOD PRESSURE: 73 MMHG

## 2020-02-10 VITALS — SYSTOLIC BLOOD PRESSURE: 132 MMHG | DIASTOLIC BLOOD PRESSURE: 85 MMHG

## 2020-02-10 VITALS — SYSTOLIC BLOOD PRESSURE: 123 MMHG | DIASTOLIC BLOOD PRESSURE: 75 MMHG

## 2020-02-10 VITALS — DIASTOLIC BLOOD PRESSURE: 75 MMHG | SYSTOLIC BLOOD PRESSURE: 123 MMHG

## 2020-02-10 VITALS — SYSTOLIC BLOOD PRESSURE: 124 MMHG | DIASTOLIC BLOOD PRESSURE: 72 MMHG

## 2020-02-10 LAB
ANION GAP SERPL CALC-SCNC: 14.4 MMOL/L (ref 8–16)
BASOPHILS # BLD AUTO: 0 10*3/UL (ref 0–0.1)
BASOPHILS NFR BLD AUTO: 0.2 % (ref 0–1)
BUN SERPL-MCNC: 20 MG/DL (ref 7–26)
BUN/CREAT SERPL: 21 (ref 6–25)
CALCIUM SERPL-MCNC: 8.4 MG/DL (ref 8.4–10.2)
CHLORIDE SERPL-SCNC: 97 MMOL/L (ref 98–107)
CO2 SERPL-SCNC: 29 MMOL/L (ref 22–29)
DEPRECATED NEUTROPHILS # BLD AUTO: 23.5 10*3/UL (ref 2.1–6.9)
DEPRECATED PHOSPHATE SERPL-MCNC: 4.1 MG/DL (ref 2.3–4.7)
EGFRCR SERPLBLD CKD-EPI 2021: > 60 ML/MIN (ref 60–?)
EOSINOPHIL # BLD AUTO: 0 10*3/UL (ref 0–0.4)
EOSINOPHIL NFR BLD AUTO: 0 % (ref 0–6)
ERYTHROCYTE [DISTWIDTH] IN CORD BLOOD: 15.1 % (ref 11.7–14.4)
GLUCOSE SERPLBLD-MCNC: 131 MG/DL (ref 74–118)
HCT VFR BLD AUTO: 35.7 % (ref 38.2–49.6)
HGB BLD-MCNC: 12 G/DL (ref 14–18)
LYMPHOCYTES # BLD: 1.7 10*3/UL (ref 1–3.2)
LYMPHOCYTES NFR BLD AUTO: 6.3 % (ref 18–39.1)
LYMPHOCYTES NFR BLD MANUAL: 7 % (ref 19–48)
MAGNESIUM SERPL-MCNC: 2 MG/DL (ref 1.3–2.1)
MCH RBC QN AUTO: 31.5 PG (ref 28–32)
MCHC RBC AUTO-ENTMCNC: 33.6 G/DL (ref 31–35)
MCV RBC AUTO: 93.7 FL (ref 81–99)
MONOCYTES # BLD AUTO: 1.2 10*3/UL (ref 0.2–0.8)
MONOCYTES NFR BLD AUTO: 4.4 % (ref 4.4–11.3)
MONOCYTES NFR BLD MANUAL: 1 % (ref 3.4–9)
NEUTS SEG NFR BLD AUTO: 88.3 % (ref 38.7–80)
NEUTS SEG NFR BLD MANUAL: 92 % (ref 40–74)
NRBC/RBC NFR BLD MANUAL: 1 %
PLAT MORPH BLD: NORMAL
PLATELET # BLD AUTO: 532 X10E3/UL (ref 140–360)
PLATELET # BLD EST: (no result) 10*3/UL
POTASSIUM SERPL-SCNC: 4.4 MMOL/L (ref 3.5–5.1)
RBC # BLD AUTO: 3.81 X10E6/UL (ref 4.3–5.7)
RBC MORPH BLD: NORMAL
SODIUM SERPL-SCNC: 136 MMOL/L (ref 136–145)

## 2020-02-10 RX ADMIN — SODIUM CHLORIDE SCH ML: 900 IRRIGANT IRRIGATION at 10:30

## 2020-02-10 RX ADMIN — SODIUM CHLORIDE SCH ML: 900 IRRIGANT IRRIGATION at 05:45

## 2020-02-10 RX ADMIN — SODIUM CHLORIDE SCH ML: 900 IRRIGANT IRRIGATION at 15:50

## 2020-02-10 RX ADMIN — SODIUM CHLORIDE SCH ML: 900 IRRIGANT IRRIGATION at 18:30

## 2020-02-10 RX ADMIN — METRONIDAZOLE SCH MLS/HR: 500 INJECTION, SOLUTION INTRAVENOUS at 21:10

## 2020-02-10 RX ADMIN — SODIUM CHLORIDE SCH ML: 900 IRRIGANT IRRIGATION at 20:44

## 2020-02-10 RX ADMIN — SODIUM CHLORIDE SCH ML: 900 IRRIGANT IRRIGATION at 02:30

## 2020-02-10 RX ADMIN — DEXTROSE AND SODIUM CHLORIDE SCH MLS/HR: 5; 450 INJECTION, SOLUTION INTRAVENOUS at 14:30

## 2020-02-10 RX ADMIN — AZTREONAM SCH MLS/HR: 1 INJECTION, POWDER, FOR SOLUTION INTRAMUSCULAR; INTRAVENOUS at 17:00

## 2020-02-10 RX ADMIN — METRONIDAZOLE SCH MLS/HR: 500 INJECTION, SOLUTION INTRAVENOUS at 15:49

## 2020-02-10 RX ADMIN — SODIUM CHLORIDE SCH ML: 900 IRRIGANT IRRIGATION at 14:30

## 2020-02-10 NOTE — NUR
RECEIVED PT FROM 108 . PT AAOX3, NO C/O PAIN, NGT TO LIS PLACEMENT CONFIRMED. IV SITE 
LEAKING, NEW IV PLACE IN LT HAND 20G. IVF STARTED. PT REMAINS NPO.

## 2020-02-10 NOTE — NUR
PT WORK WITH PATIENT AND HE WALKED 150FT. PT RECOMMENDS HOME WITH HH AND ROLLING WALKER WITH 
ASSIST

## 2020-02-10 NOTE — NUR
BEDSIDE SHIFT REPORT RECEIVED PT IN STABLE CONDITION, IVF INFUSING TO L AC 20G NO SS OF 
INFILTRATION NOTED, UPDATED ON POC VOICED UNDERSTANDING, CALL LIGHT INR EACH WILL CONTINUE 
TO MONITOR

## 2020-02-10 NOTE — NUR
Received patient awake, left nare NGT to low cont suction, no complaints of pain at this 
time, call light within easy reach, advised to call anytime when needed help. Will continue 
to monitor closely

## 2020-02-10 NOTE — DIAGNOSTIC IMAGING REPORT
Exam: KUB



Comparison: February 9, 2020



Clinical history: Bowel obstruction



Findings: There is interval insertion of a nasogastric tube with its tip

overlying the gastric fundus. Moderately prominent small bowel loops are again

noted in the upper abdomen measuring up to 4.5 cm in diameter. Bowel gas is

noted in the rectum. There is no evidence of pneumoperitoneum or pathological

calcifications. Degenerative changes are noted throughout the lumbar spine with

bridging osteophytes.



Impression:

1. Moderately dilated small bowel loops which may represent ileus versus

partial small bowel obstruction.



Signed by: Dr. Sumit Luu MD on 2/10/2020 10:55 AM

## 2020-02-10 NOTE — NUR
IM- progress note

O/N no evens

ROS: no f/c/s/HA/cp/confusion/focal limb weakness/dizziness/vision changes



v/s revd

PE

tired appearing

facial asymmetry

ns1s2

mod bs

soft; mild tender in mid abdomen

no e/t

skin dry

flat affect

a&ox3; moe



labs/med revd



A/P: 74yoM

SBO

Pancreastic adenocarcinoma

Diverticulosis

Cholelithiasis

Right lung nodule

GERD

HTN

FOrmer smoker



PLAN

Place NGT; IVF; NPO; antiemetics; NGT; Sx consult; GI eval; 

IV ppi and SCD; 



2/10 ambulate; sx eval; check stefany Hardin MD, PhD.

## 2020-02-11 VITALS — SYSTOLIC BLOOD PRESSURE: 145 MMHG | DIASTOLIC BLOOD PRESSURE: 85 MMHG

## 2020-02-11 VITALS — DIASTOLIC BLOOD PRESSURE: 91 MMHG | SYSTOLIC BLOOD PRESSURE: 135 MMHG

## 2020-02-11 VITALS — DIASTOLIC BLOOD PRESSURE: 80 MMHG | SYSTOLIC BLOOD PRESSURE: 142 MMHG

## 2020-02-11 VITALS — DIASTOLIC BLOOD PRESSURE: 82 MMHG | SYSTOLIC BLOOD PRESSURE: 151 MMHG

## 2020-02-11 VITALS — DIASTOLIC BLOOD PRESSURE: 85 MMHG | SYSTOLIC BLOOD PRESSURE: 145 MMHG

## 2020-02-11 VITALS — DIASTOLIC BLOOD PRESSURE: 88 MMHG | SYSTOLIC BLOOD PRESSURE: 143 MMHG

## 2020-02-11 VITALS — SYSTOLIC BLOOD PRESSURE: 136 MMHG | DIASTOLIC BLOOD PRESSURE: 85 MMHG

## 2020-02-11 VITALS — SYSTOLIC BLOOD PRESSURE: 135 MMHG | DIASTOLIC BLOOD PRESSURE: 91 MMHG

## 2020-02-11 LAB
ANION GAP SERPL CALC-SCNC: 14.2 MMOL/L (ref 8–16)
BASOPHILS # BLD AUTO: 0 10*3/UL (ref 0–0.1)
BASOPHILS NFR BLD AUTO: 0.2 % (ref 0–1)
BUN SERPL-MCNC: 21 MG/DL (ref 7–26)
BUN/CREAT SERPL: 26 (ref 6–25)
CALCIUM SERPL-MCNC: 8.3 MG/DL (ref 8.4–10.2)
CHLORIDE SERPL-SCNC: 98 MMOL/L (ref 98–107)
CO2 SERPL-SCNC: 28 MMOL/L (ref 22–29)
DEPRECATED NEUTROPHILS # BLD AUTO: 13.5 10*3/UL (ref 2.1–6.9)
DEPRECATED PHOSPHATE SERPL-MCNC: 3.2 MG/DL (ref 2.3–4.7)
EGFRCR SERPLBLD CKD-EPI 2021: > 60 ML/MIN (ref 60–?)
EOSINOPHIL # BLD AUTO: 0.1 10*3/UL (ref 0–0.4)
EOSINOPHIL NFR BLD AUTO: 0.4 % (ref 0–6)
ERYTHROCYTE [DISTWIDTH] IN CORD BLOOD: 14.8 % (ref 11.7–14.4)
GLUCOSE SERPLBLD-MCNC: 107 MG/DL (ref 74–118)
HCT VFR BLD AUTO: 34.5 % (ref 38.2–49.6)
HGB BLD-MCNC: 11.5 G/DL (ref 14–18)
LYMPHOCYTES # BLD: 1.4 10*3/UL (ref 1–3.2)
LYMPHOCYTES NFR BLD AUTO: 8.6 % (ref 18–39.1)
MAGNESIUM SERPL-MCNC: 2 MG/DL (ref 1.3–2.1)
MCH RBC QN AUTO: 31.7 PG (ref 28–32)
MCHC RBC AUTO-ENTMCNC: 33.3 G/DL (ref 31–35)
MCV RBC AUTO: 95 FL (ref 81–99)
MONOCYTES # BLD AUTO: 1 10*3/UL (ref 0.2–0.8)
MONOCYTES NFR BLD AUTO: 5.9 % (ref 4.4–11.3)
NEUTS SEG NFR BLD AUTO: 84.3 % (ref 38.7–80)
PLATELET # BLD AUTO: 471 X10E3/UL (ref 140–360)
POTASSIUM SERPL-SCNC: 4.2 MMOL/L (ref 3.5–5.1)
RBC # BLD AUTO: 3.63 X10E6/UL (ref 4.3–5.7)
SODIUM SERPL-SCNC: 136 MMOL/L (ref 136–145)

## 2020-02-11 RX ADMIN — SODIUM CHLORIDE SCH ML: 900 IRRIGANT IRRIGATION at 09:59

## 2020-02-11 RX ADMIN — SODIUM CHLORIDE SCH ML: 900 IRRIGANT IRRIGATION at 05:38

## 2020-02-11 RX ADMIN — METRONIDAZOLE SCH MLS/HR: 500 INJECTION, SOLUTION INTRAVENOUS at 21:50

## 2020-02-11 RX ADMIN — SODIUM CHLORIDE SCH ML: 900 IRRIGANT IRRIGATION at 00:43

## 2020-02-11 RX ADMIN — SODIUM CHLORIDE SCH ML: 900 IRRIGANT IRRIGATION at 12:15

## 2020-02-11 RX ADMIN — AZTREONAM SCH MLS/HR: 1 INJECTION, POWDER, FOR SOLUTION INTRAMUSCULAR; INTRAVENOUS at 16:58

## 2020-02-11 RX ADMIN — DEXTROSE AND SODIUM CHLORIDE SCH MLS/HR: 5; 450 INJECTION, SOLUTION INTRAVENOUS at 14:13

## 2020-02-11 RX ADMIN — SODIUM CHLORIDE SCH ML: 900 IRRIGANT IRRIGATION at 21:50

## 2020-02-11 RX ADMIN — AZTREONAM SCH MLS/HR: 1 INJECTION, POWDER, FOR SOLUTION INTRAMUSCULAR; INTRAVENOUS at 09:59

## 2020-02-11 RX ADMIN — AZTREONAM SCH MLS/HR: 1 INJECTION, POWDER, FOR SOLUTION INTRAMUSCULAR; INTRAVENOUS at 00:39

## 2020-02-11 RX ADMIN — METRONIDAZOLE SCH MLS/HR: 500 INJECTION, SOLUTION INTRAVENOUS at 05:37

## 2020-02-11 RX ADMIN — METRONIDAZOLE SCH MLS/HR: 500 INJECTION, SOLUTION INTRAVENOUS at 14:13

## 2020-02-11 RX ADMIN — SODIUM CHLORIDE SCH ML: 900 IRRIGANT IRRIGATION at 16:51

## 2020-02-11 NOTE — CONSULTATION
DATE OF CONSULTATION:  02/11/2020  

 

CHIEF COMPLAINT:  Intractable vomiting.

 

HISTORY OF PRESENT ILLNESS:  The patient is a 74-year-old male, well known to me from

recent hospitalization for small-bowel obstruction.  The patient has known advanced

pancreatic cancer, who is undergoing chemotherapy.  He was recently discharged from the

hospital after a bout of small-bowel obstruction, which resolved without surgery.  The

patient again complained of abdominal cramping with vomiting.  He denies hematemesis,

fever, or chills.  No bowel movement for last several days. 

 

PAST MEDICAL HISTORY:  Significant for inoperable advanced pancreatic carcinoma,

hypertension, coronary artery disease. 

 

PAST SURGICAL HISTORY:  Positive for appendectomy and gastric procedures.

 

ALLERGIES:  THE PATIENT IS ALLERGIC TO PENICILLIN.

 

SOCIAL HABITS:  No history of smoking or alcohol abuse.

 

REVIEW OF SYSTEMS:

He denies chest pain or shortness of breath, or cough.

 

PHYSICAL EXAMINATION:

VITAL SIGNS:  Stable.  He is afebrile.  He is awake, alert, in mild discomfort. 

HEENT:  Sclerae nonicteric. 

NECK:  Supple. 

LUNGS:  Clear. 

HEART:  Regular rate and rhythm. 

ABDOMEN:  Soft and nontender. 

EXTREMITIES:  No cyanosis or edema.

LABORATORY DATA:  White cell count is 16 with hemoglobin 11, and creatinine 0.8.  X-ray

of the abdomen show dilated loop of small bowel, representing ileus versus partial small

bowel obstruction. 

 

ASSESSMENT:  Recurrent partial small-bowel obstruction with vomiting, likely secondary

to carcinomatosis. 

 

PLAN:  NG tube decompression with serial abdominal x-ray and exam.  The patient may

benefit from intestinal bypass of point of obstruction. 

 

 

 

 

______________________________

Abraham Alvarez MD

 

DNMARQUIS/MODL

D:  02/11/2020 09:32:38

T:  02/11/2020 16:19:05

Job #:  [id number]

## 2020-02-11 NOTE — NUR
IM- progress note

O/N no evens

ROS: no f/c/s/HA/cp/confusion/focal limb weakness/dizziness/vision changes



v/s revd

PE

tired appearing

facial asymmetry

ns1s2

mod bs

soft; mild tender in mid abdomen

no e/t

skin dry

flat affect

a&ox3; moe



labs/med revd



A/P: 74yoM

SBO

Pancreastic adenocarcinoma

Diverticulosis

Cholelithiasis

Right lung nodule

GERD

HTN

FOrmer smoker



PLAN

Place NGT; IVF; NPO; antiemetics; NGT; Sx consult; GI eval; 

IV ppi and SCD; 



2/10 ambulate; sx eval; check lytes

2/11 check labs



Lane Hardin MD, PhD.

## 2020-02-11 NOTE — NUR
PATIENT RESTING IN BED BOTH EYES CLOSED IN STABLE CONDITION, NO SIGNS OS DISTRESS NOTED. IV 
FLUIDS ARE RUNNING AT ORDERED RATE AND PATIENT VOICES NO PAIN AT THIS TIME. NG TUBE IS 
INTACT AND RUNNING AT LOW CONTINUOUS SUCTION, AND PATIENT VOICES UNDERSTANDING OF NPO DIET. 
BED IS IN LOWEST POSITION POSSIBLE, BOTH SIDE RAILS ARE UP, CALL LIGHT IS WITHIN EASY REACH, 
WILL CONTINUE TO MONITOR.

## 2020-02-12 VITALS — DIASTOLIC BLOOD PRESSURE: 94 MMHG | SYSTOLIC BLOOD PRESSURE: 146 MMHG

## 2020-02-12 VITALS — DIASTOLIC BLOOD PRESSURE: 83 MMHG | SYSTOLIC BLOOD PRESSURE: 141 MMHG

## 2020-02-12 VITALS — DIASTOLIC BLOOD PRESSURE: 88 MMHG | SYSTOLIC BLOOD PRESSURE: 139 MMHG

## 2020-02-12 VITALS — DIASTOLIC BLOOD PRESSURE: 82 MMHG | SYSTOLIC BLOOD PRESSURE: 151 MMHG

## 2020-02-12 VITALS — SYSTOLIC BLOOD PRESSURE: 149 MMHG | DIASTOLIC BLOOD PRESSURE: 89 MMHG

## 2020-02-12 VITALS — SYSTOLIC BLOOD PRESSURE: 128 MMHG | DIASTOLIC BLOOD PRESSURE: 60 MMHG

## 2020-02-12 VITALS — SYSTOLIC BLOOD PRESSURE: 134 MMHG | DIASTOLIC BLOOD PRESSURE: 87 MMHG

## 2020-02-12 VITALS — DIASTOLIC BLOOD PRESSURE: 91 MMHG | SYSTOLIC BLOOD PRESSURE: 148 MMHG

## 2020-02-12 VITALS — DIASTOLIC BLOOD PRESSURE: 92 MMHG | SYSTOLIC BLOOD PRESSURE: 146 MMHG

## 2020-02-12 VITALS — SYSTOLIC BLOOD PRESSURE: 154 MMHG | DIASTOLIC BLOOD PRESSURE: 92 MMHG

## 2020-02-12 VITALS — SYSTOLIC BLOOD PRESSURE: 138 MMHG | DIASTOLIC BLOOD PRESSURE: 100 MMHG

## 2020-02-12 VITALS — SYSTOLIC BLOOD PRESSURE: 143 MMHG | DIASTOLIC BLOOD PRESSURE: 90 MMHG

## 2020-02-12 VITALS — SYSTOLIC BLOOD PRESSURE: 137 MMHG | DIASTOLIC BLOOD PRESSURE: 92 MMHG

## 2020-02-12 VITALS — DIASTOLIC BLOOD PRESSURE: 94 MMHG | SYSTOLIC BLOOD PRESSURE: 148 MMHG

## 2020-02-12 VITALS — SYSTOLIC BLOOD PRESSURE: 151 MMHG | DIASTOLIC BLOOD PRESSURE: 82 MMHG

## 2020-02-12 LAB
ANION GAP SERPL CALC-SCNC: 11.9 MMOL/L (ref 8–16)
BASOPHILS # BLD AUTO: 0.1 10*3/UL (ref 0–0.1)
BASOPHILS NFR BLD AUTO: 0.3 % (ref 0–1)
BUN SERPL-MCNC: 19 MG/DL (ref 7–26)
BUN/CREAT SERPL: 26 (ref 6–25)
CALCIUM SERPL-MCNC: 8.1 MG/DL (ref 8.4–10.2)
CHLORIDE SERPL-SCNC: 102 MMOL/L (ref 98–107)
CO2 SERPL-SCNC: 26 MMOL/L (ref 22–29)
DEPRECATED NEUTROPHILS # BLD AUTO: 12.3 10*3/UL (ref 2.1–6.9)
DEPRECATED PHOSPHATE SERPL-MCNC: 3.1 MG/DL (ref 2.3–4.7)
EGFRCR SERPLBLD CKD-EPI 2021: > 60 ML/MIN (ref 60–?)
EOSINOPHIL # BLD AUTO: 0.1 10*3/UL (ref 0–0.4)
EOSINOPHIL NFR BLD AUTO: 0.9 % (ref 0–6)
ERYTHROCYTE [DISTWIDTH] IN CORD BLOOD: 14.6 % (ref 11.7–14.4)
GLUCOSE SERPLBLD-MCNC: 104 MG/DL (ref 74–118)
HCT VFR BLD AUTO: 34.3 % (ref 38.2–49.6)
HGB BLD-MCNC: 11.3 G/DL (ref 14–18)
LYMPHOCYTES # BLD: 1.6 10*3/UL (ref 1–3.2)
LYMPHOCYTES NFR BLD AUTO: 10.5 % (ref 18–39.1)
MAGNESIUM SERPL-MCNC: 1.9 MG/DL (ref 1.3–2.1)
MCH RBC QN AUTO: 30.8 PG (ref 28–32)
MCHC RBC AUTO-ENTMCNC: 32.9 G/DL (ref 31–35)
MCV RBC AUTO: 93.5 FL (ref 81–99)
MONOCYTES # BLD AUTO: 1.1 10*3/UL (ref 0.2–0.8)
MONOCYTES NFR BLD AUTO: 7 % (ref 4.4–11.3)
NEUTS SEG NFR BLD AUTO: 80.8 % (ref 38.7–80)
PLATELET # BLD AUTO: 445 X10E3/UL (ref 140–360)
POTASSIUM SERPL-SCNC: 3.9 MMOL/L (ref 3.5–5.1)
RBC # BLD AUTO: 3.67 X10E6/UL (ref 4.3–5.7)
SODIUM SERPL-SCNC: 136 MMOL/L (ref 136–145)

## 2020-02-12 PROCEDURE — 0D1B0ZB BYPASS ILEUM TO ILEUM, OPEN APPROACH: ICD-10-PCS | Performed by: SURGERY

## 2020-02-12 RX ADMIN — SODIUM CHLORIDE SCH ML: 900 IRRIGANT IRRIGATION at 22:30

## 2020-02-12 RX ADMIN — SODIUM CHLORIDE SCH ML: 900 IRRIGANT IRRIGATION at 21:00

## 2020-02-12 RX ADMIN — SODIUM CHLORIDE SCH ML: 900 IRRIGANT IRRIGATION at 06:22

## 2020-02-12 RX ADMIN — AZTREONAM SCH MLS/HR: 1 INJECTION, POWDER, FOR SOLUTION INTRAMUSCULAR; INTRAVENOUS at 08:16

## 2020-02-12 RX ADMIN — Medication PRN MG: at 16:21

## 2020-02-12 RX ADMIN — DEXTROSE AND SODIUM CHLORIDE SCH MLS/HR: 5; 450 INJECTION, SOLUTION INTRAVENOUS at 16:21

## 2020-02-12 RX ADMIN — AZTREONAM SCH MLS/HR: 1 INJECTION, POWDER, FOR SOLUTION INTRAMUSCULAR; INTRAVENOUS at 17:12

## 2020-02-12 RX ADMIN — AZTREONAM SCH MLS/HR: 1 INJECTION, POWDER, FOR SOLUTION INTRAMUSCULAR; INTRAVENOUS at 01:06

## 2020-02-12 RX ADMIN — SODIUM CHLORIDE SCH ML: 900 IRRIGANT IRRIGATION at 10:25

## 2020-02-12 RX ADMIN — METRONIDAZOLE SCH MLS/HR: 500 INJECTION, SOLUTION INTRAVENOUS at 14:00

## 2020-02-12 RX ADMIN — Medication PRN MG: at 21:08

## 2020-02-12 RX ADMIN — METRONIDAZOLE SCH MLS/HR: 500 INJECTION, SOLUTION INTRAVENOUS at 06:22

## 2020-02-12 RX ADMIN — METRONIDAZOLE SCH MLS/HR: 500 INJECTION, SOLUTION INTRAVENOUS at 22:18

## 2020-02-12 RX ADMIN — SODIUM CHLORIDE SCH ML: 900 IRRIGANT IRRIGATION at 14:30

## 2020-02-12 RX ADMIN — SODIUM CHLORIDE SCH ML: 900 IRRIGANT IRRIGATION at 02:06

## 2020-02-12 NOTE — NUR
PATIENT OFF THE UNIT TO OR. PATIENT IN STABLE CONDITION WITH NO S/S OF RESPIRATORY DISTRESS. 
NG TUBE PLACED TO LEFT NARE AND CLAMPED.

## 2020-02-12 NOTE — DIAGNOSTIC IMAGING REPORT
Exam: KUB 



Comparison: February 10, 2020



Clinical history: Small bowel obstruction 



Findings: The nasogastric tube is unchanged in position. There is no

significant interval changes in the proximal small bowel dilation. There is no

gross evidence of pneumoperitoneum. The regional osseous structures are

unchanged.



Signed by: Dr. Sumit Luu MD on 2/12/2020 11:50 AM

## 2020-02-12 NOTE — NUR
Nutrition Intervention Note



RD Recommendation(s) for Physician: 

-ADAT to GI soft per MD. 

-If diet does not advance after surgery within 72 hours, please consider alternate source of 
nutrition and consult RD services. 



Plan of Care: RD following, monitoring for tolerance and adequacy . ONS when diet is 
advanced. 



Nutrition reason for involvement:

(Possible TPN pt) 



RD Assessment

2/12: 74 YOM ad mitted for SBO with PMH listed below. The pt was seen resting in bed, NGT in 
place with daughter at bedside. Spoke about pt in rounds, per nurse he is going for surgery 
today (intestinal bypass of point of obstruction) and MD may initiate TPN after surgery but 
was still deciding, please consult RD if decision is made to start TPN. Pt reported he has 
been eating well at home despite losing weight d/t his chemo. He reported he lost 10-15 lbs 
in the past 2-3 months.  Pt was here in Oct 2019, pt was 170 lbs at that suggesting a 3% 
weight loss within 3 months which is not significant enough to meet ASPEN malnutrition 
criteria. He denied chewing or swallowing issues as well as any food allergies. Will 
continue to monitor. 



Principal Problems/Diagnoses:SBO



PMH: Significant for inoperable advanced pancreatic carcinoma,

hypertension, coronary artery disease.



GI: Abd: flat, soft, non-tender LBM: 2/11



Skin: no pressure ulcer recorded 



Labs: 

2/12:Na 136, K 3.9, Cl 102, CO2 26, BUN 19, Creat 0.73, Gluc 104, Ca 8.1, Phos 3.1, Mg 1.9, 
Tbili 0.7 



Meds: abx, zofran 

IVF: D5NS at 50 ml/he (60 gram dex, 204 kcal) 



Ht:65 in (not 60 inches)

Wt: 165 lbs 

BMI: 27.5 kg/m^2

IBW:136lbs 



Malnutrition Evaluation (2/12/20) 

The patient does not meet criteria for a specified degree of malnutrition at this time. Will 
re-evaluate at follow-up as appropriate. 



Energy intake:

-pt denies poor oral intake

Weight loss:

. He reported he lost 10-15 lbs in the past 2-3 months.  Pt was here in Oct 2018, pt was 170 
lbs at that suggesting a 3% weight loss within 3 months which is not significant enough to 
meet ASPEN malnutrition criteria



Fat loss: Mild- dark eyes 

Muscle loss: Mild-temporal wasting  





Fluid accumulation: No cyanosis or edema.

Functional Status: unable to evaluate





Nutrition Prescription (Diet Order):npo 



Estimated Nutritional Needs:

Calories: 1350-1650kcal/day (18-22  kcal/kg/day) Weight used : 75 kg CBW

Protein : 75-113protein/day (1-1.5 gram/kg/day ) Weight used: 75 kg CBW



Diet Adequacy:

Not meeting calorie needs, Not meeting protein needs





Diet Education Needs Assessment:

Diet education not indicated, patient on temporary/transition diet.



Nutrition Care Level: high 



Nutrition Diagnosis: Inadequate energy intake related to medical condition as evidenced by 
having NPO diet order and possible need for TPN initiation.  





Goal: Patient will meet % of estimated needs by follow up 

Progress: N/A



Interventions:

- fiber modified diet, Commercial beverage, Composition, Rate, Route, IVF, Prescription 
medications, Collaboration with other providers,



Monitoring/Evaluation:

Total energy intake, Total protein intake, Formula/Solution, IVF, Prescription medication, 
Modified diet, Liquid supplement, Weight change





Signed: Shelby Ambrose RD, LD

## 2020-02-12 NOTE — NUR
IM- progress note

O/N no evens

ROS: no f/c/s/HA/cp/confusion/focal limb weakness/dizziness/vision changes



v/s revd

PE

tired appearing

facial asymmetry

ns1s2

mod bs

soft; mild tender in mid abdomen

no e/t

skin dry

flat affect

a&ox3; moe



labs/med revd



A/P: 74yoM

SBO

Pancreastic adenocarcinoma

Diverticulosis

Cholelithiasis

Right lung nodule

GERD

HTN

FOrmer smoker



PLAN

Place NGT; IVF; NPO; antiemetics; NGT; Sx consult; GI eval; 

IV ppi and SCD; 



2/10 ambulate; sx eval; check lytes

2/11 check labs

2/12 cont care; check labs; f/u sx plan; Ambulate; optimize lytes



Lane Hardin MD, PhD.

## 2020-02-12 NOTE — NUR
PATIENT IS AWAKE, ALERT, AND IN STABLE CONDITION WITH NO S/S OF RESPIRATORY DISTRESS. NO 
PAIN VOICED. NG TUBE PLACED TO LEFT NARE AND CONNECTED TO LIWS. IV FLUIDS INFUSING. 
TELEMETRY APPLIED. CALL LIGHT IS WITHIN REACH, PATIENT INSTRUCTED TO CALL FOR ASSISTANCE AS 
NEEDED. BED ALARM APPLIED, DAUGHTER PRESENT IN ROOM.

## 2020-02-12 NOTE — OPERATIVE REPORT
DATE OF PROCEDURE:  02/12/2020

 

SURGEON:  Abraham Alvarez MD

 

PREOPERATIVE DIAGNOSIS:  Intestinal obstruction.

 

POSTOPERATIVE DIAGNOSIS:  Carcinomatosis with intestinal obstruction.

 

OPERATIVE PROCEDURES:  Exploratory laparotomy and intestinal bypass.

 

ANESTHESIA:  General.

 

INDICATIONS:  A 74-year-old male with history of metastatic pancreatic cancer with

chronic recurrent intestinal obstruction with CT scan show high-grade obstruction in the

ileum.  The patient consented for exploratory laparotomy and possible intestinal bypass. 

 

PROCEDURE FINDINGS:  Metastatic pancreatic cancer with intraperitoneal implant with

bowel implant causing high-grade obstruction of the ileum. 

 

DESCRIPTION OF PROCEDURE:  The patient was brought to the OR and intubated.  The abdomen

was prepped with alcohol and draped in sterile fashion.  A low midline incision was made

through the linea alba entering the peritoneal cavity.  Peritoneal fluid was encountered

and approximately 200 mL of peritoneal fluid suctioned out.  Exploration revealed

significant intraperitoneal implants from metastatic disease with intestinal implant on

the ileum causing high-grade obstruction at the lower ileum.  We proceeded to perform

intestinal bypass using the loops of bowel entering and leaving the site of obstruction.

 The loops of bowel had placed [id number] and a 2-layer anastomosis was carried out

using 3-0 silk Lembert stitches for a seromuscular posterior wall.  Enterotomy was made

approximately 2 cm from each site and a running 3-0 Vicryl full thickness anastomosis

was carried out with 3-0 Vicryl.  The anterior Lembert 3-0 silk stitch was placed in the

seromuscular layer to reinforce the anastomosis.  At the end of the anastomosis, we have

adequate lumen.  Hemostasis achieved.  Operative field was irrigated.  Fascia was then

closed with a running #0 PDS and #0 Vicryl.  Skin was closed with staples.  The patient

was extubated and transported to recovery room. 

 

ESTIMATED BLOOD LOSS:  10 mL.

 

 

 

 

______________________________

Abraham Alvarez MD

 

DNL/MODL

D:  02/12/2020 15:50:20

T:  02/12/2020 22:36:29

Job #:  [id number]

## 2020-02-13 VITALS — SYSTOLIC BLOOD PRESSURE: 143 MMHG | DIASTOLIC BLOOD PRESSURE: 92 MMHG

## 2020-02-13 VITALS — DIASTOLIC BLOOD PRESSURE: 90 MMHG | SYSTOLIC BLOOD PRESSURE: 148 MMHG

## 2020-02-13 VITALS — DIASTOLIC BLOOD PRESSURE: 92 MMHG | SYSTOLIC BLOOD PRESSURE: 159 MMHG

## 2020-02-13 VITALS — DIASTOLIC BLOOD PRESSURE: 94 MMHG | SYSTOLIC BLOOD PRESSURE: 149 MMHG

## 2020-02-13 VITALS — DIASTOLIC BLOOD PRESSURE: 88 MMHG | SYSTOLIC BLOOD PRESSURE: 149 MMHG

## 2020-02-13 VITALS — SYSTOLIC BLOOD PRESSURE: 155 MMHG | DIASTOLIC BLOOD PRESSURE: 98 MMHG

## 2020-02-13 VITALS — SYSTOLIC BLOOD PRESSURE: 148 MMHG | DIASTOLIC BLOOD PRESSURE: 92 MMHG

## 2020-02-13 VITALS — DIASTOLIC BLOOD PRESSURE: 98 MMHG | SYSTOLIC BLOOD PRESSURE: 155 MMHG

## 2020-02-13 VITALS — DIASTOLIC BLOOD PRESSURE: 93 MMHG | SYSTOLIC BLOOD PRESSURE: 142 MMHG

## 2020-02-13 VITALS — SYSTOLIC BLOOD PRESSURE: 159 MMHG | DIASTOLIC BLOOD PRESSURE: 99 MMHG

## 2020-02-13 VITALS — DIASTOLIC BLOOD PRESSURE: 90 MMHG | SYSTOLIC BLOOD PRESSURE: 141 MMHG

## 2020-02-13 VITALS — SYSTOLIC BLOOD PRESSURE: 130 MMHG | DIASTOLIC BLOOD PRESSURE: 82 MMHG

## 2020-02-13 VITALS — DIASTOLIC BLOOD PRESSURE: 86 MMHG | SYSTOLIC BLOOD PRESSURE: 154 MMHG

## 2020-02-13 VITALS — DIASTOLIC BLOOD PRESSURE: 93 MMHG | SYSTOLIC BLOOD PRESSURE: 156 MMHG

## 2020-02-13 VITALS — DIASTOLIC BLOOD PRESSURE: 90 MMHG | SYSTOLIC BLOOD PRESSURE: 139 MMHG

## 2020-02-13 VITALS — SYSTOLIC BLOOD PRESSURE: 166 MMHG | DIASTOLIC BLOOD PRESSURE: 99 MMHG

## 2020-02-13 VITALS — SYSTOLIC BLOOD PRESSURE: 130 MMHG | DIASTOLIC BLOOD PRESSURE: 87 MMHG

## 2020-02-13 VITALS — SYSTOLIC BLOOD PRESSURE: 148 MMHG | DIASTOLIC BLOOD PRESSURE: 97 MMHG

## 2020-02-13 VITALS — DIASTOLIC BLOOD PRESSURE: 91 MMHG | SYSTOLIC BLOOD PRESSURE: 147 MMHG

## 2020-02-13 VITALS — DIASTOLIC BLOOD PRESSURE: 94 MMHG | SYSTOLIC BLOOD PRESSURE: 145 MMHG

## 2020-02-13 VITALS — DIASTOLIC BLOOD PRESSURE: 86 MMHG | SYSTOLIC BLOOD PRESSURE: 143 MMHG

## 2020-02-13 LAB
ANION GAP SERPL CALC-SCNC: 12.6 MMOL/L (ref 8–16)
BASOPHILS # BLD AUTO: 0 10*3/UL (ref 0–0.1)
BASOPHILS NFR BLD AUTO: 0.3 % (ref 0–1)
BUN SERPL-MCNC: 14 MG/DL (ref 7–26)
BUN/CREAT SERPL: 20 (ref 6–25)
CALCIUM SERPL-MCNC: 7.6 MG/DL (ref 8.4–10.2)
CHLORIDE SERPL-SCNC: 103 MMOL/L (ref 98–107)
CO2 SERPL-SCNC: 21 MMOL/L (ref 22–29)
DEPRECATED APTT PLAS QN: 32.3 SECONDS (ref 23.8–35.5)
DEPRECATED INR PLAS: 1.24
DEPRECATED NEUTROPHILS # BLD AUTO: 13.7 10*3/UL (ref 2.1–6.9)
EGFRCR SERPLBLD CKD-EPI 2021: > 60 ML/MIN (ref 60–?)
EOSINOPHIL # BLD AUTO: 0.1 10*3/UL (ref 0–0.4)
EOSINOPHIL NFR BLD AUTO: 0.5 % (ref 0–6)
ERYTHROCYTE [DISTWIDTH] IN CORD BLOOD: 14.5 % (ref 11.7–14.4)
GLUCOSE SERPLBLD-MCNC: 130 MG/DL (ref 74–118)
HCT VFR BLD AUTO: 35.2 % (ref 38.2–49.6)
HGB BLD-MCNC: 11.2 G/DL (ref 14–18)
LYMPHOCYTES # BLD: 1 10*3/UL (ref 1–3.2)
LYMPHOCYTES NFR BLD AUTO: 6.5 % (ref 18–39.1)
MCH RBC QN AUTO: 30.5 PG (ref 28–32)
MCHC RBC AUTO-ENTMCNC: 31.8 G/DL (ref 31–35)
MCV RBC AUTO: 95.9 FL (ref 81–99)
MONOCYTES # BLD AUTO: 0.8 10*3/UL (ref 0.2–0.8)
MONOCYTES NFR BLD AUTO: 4.9 % (ref 4.4–11.3)
NEUTS SEG NFR BLD AUTO: 87.1 % (ref 38.7–80)
PLATELET # BLD AUTO: 350 X10E3/UL (ref 140–360)
POTASSIUM SERPL-SCNC: 3.6 MMOL/L (ref 3.5–5.1)
PROTHROMBIN TIME: 16.4 SECONDS (ref 11.9–14.5)
RBC # BLD AUTO: 3.67 X10E6/UL (ref 4.3–5.7)
SODIUM SERPL-SCNC: 133 MMOL/L (ref 136–145)

## 2020-02-13 PROCEDURE — 02HV33Z INSERTION OF INFUSION DEVICE INTO SUPERIOR VENA CAVA, PERCUTANEOUS APPROACH: ICD-10-PCS

## 2020-02-13 RX ADMIN — AZTREONAM SCH MLS/HR: 1 INJECTION, POWDER, FOR SOLUTION INTRAMUSCULAR; INTRAVENOUS at 08:00

## 2020-02-13 RX ADMIN — SODIUM CHLORIDE SCH ML: 900 IRRIGANT IRRIGATION at 19:18

## 2020-02-13 RX ADMIN — Medication PRN MG: at 15:07

## 2020-02-13 RX ADMIN — METRONIDAZOLE SCH MLS/HR: 500 INJECTION, SOLUTION INTRAVENOUS at 06:02

## 2020-02-13 RX ADMIN — METRONIDAZOLE SCH MLS/HR: 500 INJECTION, SOLUTION INTRAVENOUS at 21:20

## 2020-02-13 RX ADMIN — DEXTROSE AND SODIUM CHLORIDE SCH MLS/HR: 5; 450 INJECTION, SOLUTION INTRAVENOUS at 13:17

## 2020-02-13 RX ADMIN — DEXTROSE AND SODIUM CHLORIDE SCH MLS/HR: 5; 450 INJECTION, SOLUTION INTRAVENOUS at 00:06

## 2020-02-13 RX ADMIN — METRONIDAZOLE SCH MLS/HR: 500 INJECTION, SOLUTION INTRAVENOUS at 13:17

## 2020-02-13 RX ADMIN — AZTREONAM SCH MLS/HR: 1 INJECTION, POWDER, FOR SOLUTION INTRAMUSCULAR; INTRAVENOUS at 01:54

## 2020-02-13 RX ADMIN — SODIUM CHLORIDE SCH ML: 900 IRRIGANT IRRIGATION at 10:21

## 2020-02-13 RX ADMIN — Medication PRN MG: at 06:05

## 2020-02-13 RX ADMIN — SODIUM CHLORIDE SCH ML: 900 IRRIGANT IRRIGATION at 02:30

## 2020-02-13 RX ADMIN — DEXTROSE AND SODIUM CHLORIDE SCH MLS/HR: 5; 450 INJECTION, SOLUTION INTRAVENOUS at 08:00

## 2020-02-13 RX ADMIN — SODIUM CHLORIDE SCH ML: 900 IRRIGANT IRRIGATION at 06:30

## 2020-02-13 RX ADMIN — SODIUM CHLORIDE SCH ML: 900 IRRIGANT IRRIGATION at 14:31

## 2020-02-13 RX ADMIN — Medication PRN MG: at 20:35

## 2020-02-13 RX ADMIN — Medication PRN MG: at 10:18

## 2020-02-13 RX ADMIN — DEXTROSE AND SODIUM CHLORIDE SCH MLS/HR: 5; 450 INJECTION, SOLUTION INTRAVENOUS at 20:30

## 2020-02-13 RX ADMIN — SODIUM CHLORIDE SCH ML: 900 IRRIGANT IRRIGATION at 22:30

## 2020-02-13 RX ADMIN — AZTREONAM SCH MLS/HR: 1 INJECTION, POWDER, FOR SOLUTION INTRAMUSCULAR; INTRAVENOUS at 16:18

## 2020-02-13 NOTE — DIAGNOSTIC IMAGING REPORT
Central venous catheter insertion.                                     

                                                                             

History: Need for TPN.                                                 

                                                                               

Modality: Fluoroscopy and sonography.                                    

                                                                              

Sedation: None.



:  Geovanny Villanueva MD.



Assistant:  None.                                               



Approach: Right internal jugular vein                                     



Estimated blood loss:  < 5 cc.



Specimen: None.  



Fluoroscopy Time: 0.1 min.

Reference Air Kerma (Ka, r): 0.9 mGy.



Technique: 

Informed written consent was obtained. Discussion of risks, benefits, and

alternatives were made with the patient. The patient expressed understanding

and agreed to proceed.  A universal timeout was performed prior to starting the

procedure.  All elements maximal sterile barrier technique was utilized for

this procedure, including utilization of sterile scrub solution for skin prep,

a large sterile sheet to cover the areas of the patient that were not prepped,

and hand hygiene, mask, head covering, and sterile gown for performing

radiologist and scrub technologist.



The skin was anesthetized with 2% lidocaine. Ultrasound evaluation showed a

patent and compressible right internal jugular vein, which was punctured under

direct real-time ultrasound guidance with a micropuncture needle.  An

ultrasound image was saved to PACS. A microwire and sheath were placed. A 0.035

inch wire was placed through the sheath into the IVC. The tract was dilated.

The 16 cm 7 Swedish triple-lumen catheter was placed over the wire with its

distal tip terminating in the superior right atrium.  The ports were flushed

and aspirated easily following placement.  The catheter was sutured to the skin

to secure its placement.  Vital signs were monitored throughout the procedure

by a nurse, and remained stable.  The patient tolerated the procedure well and

left the department in the same condition.             

                                                                               

Results:  Spot radiograph of the chest demonstrates the new triple-lumen

catheter to lie in the expected position with its tip overlying the right

atrium.                                                             

          

                                                                     

Impression:                          

                                    

Successful, uncomplicated placement of a right internal jugular triple lumen

catheter using sonographic and fluoroscopic guidance.  The catheter is ready

for immediate use.     



Signed by: Dr. Geovanny Villanueva MD on 2/13/2020 1:22 PM

## 2020-02-13 NOTE — NUR
Patient transferred to a higher level of care. Please send new PT orders if you want patient 
to resume physical therapy.

-------------------------------------------------------------------------------

Addendum: 02/13/20 at 0851 by Suki Gutiérrez PT

-------------------------------------------------------------------------------

Amended: Links added.

## 2020-02-13 NOTE — DIAGNOSTIC IMAGING REPORT
Central venous catheter insertion.                                     

                                                                             

History: Need for TPN.                                                 

                                                                               

Modality: Fluoroscopy and sonography.                                    

                                                                              

Sedation: None.



:  Geovanny Villanueva MD.



Assistant:  None.                                               



Approach: Right internal jugular vein                                     



Estimated blood loss:  < 5 cc.



Specimen: None.  



Fluoroscopy Time: 0.1 min.

Reference Air Kerma (Ka, r): 0.9 mGy.



Technique: 

Informed written consent was obtained. Discussion of risks, benefits, and

alternatives were made with the patient. The patient expressed understanding

and agreed to proceed.  A universal timeout was performed prior to starting the

procedure.  All elements maximal sterile barrier technique was utilized for

this procedure, including utilization of sterile scrub solution for skin prep,

a large sterile sheet to cover the areas of the patient that were not prepped,

and hand hygiene, mask, head covering, and sterile gown for performing

radiologist and scrub technologist.



The skin was anesthetized with 2% lidocaine. Ultrasound evaluation showed a

patent and compressible right internal jugular vein, which was punctured under

direct real-time ultrasound guidance with a micropuncture needle.  An

ultrasound image was saved to PACS. A microwire and sheath were placed. A 0.035

inch wire was placed through the sheath into the IVC. The tract was dilated.

The 16 cm 7 Romansh triple-lumen catheter was placed over the wire with its

distal tip terminating in the superior right atrium.  The ports were flushed

and aspirated easily following placement.  The catheter was sutured to the skin

to secure its placement.  Vital signs were monitored throughout the procedure

by a nurse, and remained stable.  The patient tolerated the procedure well and

left the department in the same condition.             

                                                                               

Results:  Spot radiograph of the chest demonstrates the new triple-lumen

catheter to lie in the expected position with its tip overlying the right

atrium.                                                             

          

                                                                     

Impression:                          

                                    

Successful, uncomplicated placement of a right internal jugular triple lumen

catheter using sonographic and fluoroscopic guidance.  The catheter is ready

for immediate use.     



Signed by: Dr. Geovanny Villanueva MD on 2/13/2020 1:22 PM

## 2020-02-13 NOTE — NUR
NOTIFIED DR LING OF THE CHANGE IN HEPARIN DOSE, HE IS OK WITH 1200UNIT/DAY, PT STABLE 
WILL CONTINUE TO MONITOR

## 2020-02-13 NOTE — NUR
dr carranza making rounds.  updated pt and family on current status and poc.  recommendations 
to start an central line for tpn administration.  family( 2 daughters) and pt verbalize 
understanding and consent of procedure.  consent signed by pt.

## 2020-02-13 NOTE — DIAGNOSTIC IMAGING REPORT
Central venous catheter insertion.                                     

                                                                             

History: Need for TPN.                                                 

                                                                               

Modality: Fluoroscopy and sonography.                                    

                                                                              

Sedation: None.



:  Geovanny Villanueva MD.



Assistant:  None.                                               



Approach: Right internal jugular vein                                     



Estimated blood loss:  < 5 cc.



Specimen: None.  



Fluoroscopy Time: 0.1 min.

Reference Air Kerma (Ka, r): 0.9 mGy.



Technique: 

Informed written consent was obtained. Discussion of risks, benefits, and

alternatives were made with the patient. The patient expressed understanding

and agreed to proceed.  A universal timeout was performed prior to starting the

procedure.  All elements maximal sterile barrier technique was utilized for

this procedure, including utilization of sterile scrub solution for skin prep,

a large sterile sheet to cover the areas of the patient that were not prepped,

and hand hygiene, mask, head covering, and sterile gown for performing

radiologist and scrub technologist.



The skin was anesthetized with 2% lidocaine. Ultrasound evaluation showed a

patent and compressible right internal jugular vein, which was punctured under

direct real-time ultrasound guidance with a micropuncture needle.  An

ultrasound image was saved to PACS. A microwire and sheath were placed. A 0.035

inch wire was placed through the sheath into the IVC. The tract was dilated.

The 16 cm 7 Armenian triple-lumen catheter was placed over the wire with its

distal tip terminating in the superior right atrium.  The ports were flushed

and aspirated easily following placement.  The catheter was sutured to the skin

to secure its placement.  Vital signs were monitored throughout the procedure

by a nurse, and remained stable.  The patient tolerated the procedure well and

left the department in the same condition.             

                                                                               

Results:  Spot radiograph of the chest demonstrates the new triple-lumen

catheter to lie in the expected position with its tip overlying the right

atrium.                                                             

          

                                                                     

Impression:                          

                                    

Successful, uncomplicated placement of a right internal jugular triple lumen

catheter using sonographic and fluoroscopic guidance.  The catheter is ready

for immediate use.     



Signed by: Dr. Geovanny Villanueva MD on 2/13/2020 1:22 PM

## 2020-02-13 NOTE — NUR
PT DUE TO VOID AND UNSUCCESSFUL AFTER SEVERAL ATTEMPTS WITH USING URINAL. BLADDER SCAN 
REVEALED 650ML, COUDE CATHETER INSERTED PER ORDER, IMMEDIATE OUTPUT 750ML. ASEPTIC TECHNIQUE 
MAINTAINED, LIYAH CANELA ASSISTING, PT TOLERATED WELL.

## 2020-02-13 NOTE — NUR
IM- progress note

O/N no evens

ROS: no f/c/s/HA/cp/confusion/focal limb weakness/dizziness/vision changes



v/s revd

PE

tired appearing

facial asymmetry

ns1s2

mod bs

soft; mild tender in mid abdomen

no e/t

skin dry

flat affect

a&ox3; moe



labs/med revd



A/P: 74yoM

SBO

Pancreastic adenocarcinoma

Diverticulosis

Cholelithiasis

Right lung nodule

GERD

HTN

FOrmer smoker



PLAN

Place NGT; IVF; NPO; antiemetics; NGT; Sx consult; GI eval; 

IV ppi and SCD; 



2/10 ambulate; sx eval; check lytes

2/11 check labs

2/12 cont care; check labs; f/u sx plan; Ambulate; optimize lytes

2/13 s/p surgery; check labs



Lane Hardin MD, PhD.

## 2020-02-13 NOTE — DIAGNOSTIC IMAGING REPORT
Central venous catheter insertion.                                     

                                                                             

History: Need for TPN.                                                 

                                                                               

Modality: Fluoroscopy and sonography.                                    

                                                                              

Sedation: None.



:  Geovanny Villanueva MD.



Assistant:  None.                                               



Approach: Right internal jugular vein                                     



Estimated blood loss:  < 5 cc.



Specimen: None.  



Fluoroscopy Time: 0.1 min.

Reference Air Kerma (Ka, r): 0.9 mGy.



Technique: 

Informed written consent was obtained. Discussion of risks, benefits, and

alternatives were made with the patient. The patient expressed understanding

and agreed to proceed.  A universal timeout was performed prior to starting the

procedure.  All elements maximal sterile barrier technique was utilized for

this procedure, including utilization of sterile scrub solution for skin prep,

a large sterile sheet to cover the areas of the patient that were not prepped,

and hand hygiene, mask, head covering, and sterile gown for performing

radiologist and scrub technologist.



The skin was anesthetized with 2% lidocaine. Ultrasound evaluation showed a

patent and compressible right internal jugular vein, which was punctured under

direct real-time ultrasound guidance with a micropuncture needle.  An

ultrasound image was saved to PACS. A microwire and sheath were placed. A 0.035

inch wire was placed through the sheath into the IVC. The tract was dilated.

The 16 cm 7 Italian triple-lumen catheter was placed over the wire with its

distal tip terminating in the superior right atrium.  The ports were flushed

and aspirated easily following placement.  The catheter was sutured to the skin

to secure its placement.  Vital signs were monitored throughout the procedure

by a nurse, and remained stable.  The patient tolerated the procedure well and

left the department in the same condition.             

                                                                               

Results:  Spot radiograph of the chest demonstrates the new triple-lumen

catheter to lie in the expected position with its tip overlying the right

atrium.                                                             

          

                                                                     

Impression:                          

                                    

Successful, uncomplicated placement of a right internal jugular triple lumen

catheter using sonographic and fluoroscopic guidance.  The catheter is ready

for immediate use.     



Signed by: Dr. Geovanny Villanueva MD on 2/13/2020 1:22 PM

## 2020-02-14 VITALS — SYSTOLIC BLOOD PRESSURE: 167 MMHG | DIASTOLIC BLOOD PRESSURE: 95 MMHG

## 2020-02-14 VITALS — DIASTOLIC BLOOD PRESSURE: 95 MMHG | SYSTOLIC BLOOD PRESSURE: 135 MMHG

## 2020-02-14 VITALS — DIASTOLIC BLOOD PRESSURE: 86 MMHG | SYSTOLIC BLOOD PRESSURE: 154 MMHG

## 2020-02-14 VITALS — DIASTOLIC BLOOD PRESSURE: 95 MMHG | SYSTOLIC BLOOD PRESSURE: 156 MMHG

## 2020-02-14 VITALS — SYSTOLIC BLOOD PRESSURE: 157 MMHG | DIASTOLIC BLOOD PRESSURE: 93 MMHG

## 2020-02-14 VITALS — SYSTOLIC BLOOD PRESSURE: 161 MMHG | DIASTOLIC BLOOD PRESSURE: 86 MMHG

## 2020-02-14 VITALS — SYSTOLIC BLOOD PRESSURE: 139 MMHG | DIASTOLIC BLOOD PRESSURE: 104 MMHG

## 2020-02-14 VITALS — SYSTOLIC BLOOD PRESSURE: 141 MMHG | DIASTOLIC BLOOD PRESSURE: 94 MMHG

## 2020-02-14 VITALS — DIASTOLIC BLOOD PRESSURE: 90 MMHG | SYSTOLIC BLOOD PRESSURE: 153 MMHG

## 2020-02-14 VITALS — SYSTOLIC BLOOD PRESSURE: 148 MMHG | DIASTOLIC BLOOD PRESSURE: 100 MMHG

## 2020-02-14 VITALS — SYSTOLIC BLOOD PRESSURE: 163 MMHG | DIASTOLIC BLOOD PRESSURE: 104 MMHG

## 2020-02-14 VITALS — DIASTOLIC BLOOD PRESSURE: 92 MMHG | SYSTOLIC BLOOD PRESSURE: 140 MMHG

## 2020-02-14 VITALS — SYSTOLIC BLOOD PRESSURE: 148 MMHG | DIASTOLIC BLOOD PRESSURE: 97 MMHG

## 2020-02-14 VITALS — DIASTOLIC BLOOD PRESSURE: 88 MMHG | SYSTOLIC BLOOD PRESSURE: 164 MMHG

## 2020-02-14 VITALS — DIASTOLIC BLOOD PRESSURE: 96 MMHG | SYSTOLIC BLOOD PRESSURE: 147 MMHG

## 2020-02-14 LAB
ANION GAP SERPL CALC-SCNC: 11.5 MMOL/L (ref 8–16)
BASOPHILS # BLD AUTO: 0.1 10*3/UL (ref 0–0.1)
BASOPHILS NFR BLD AUTO: 0.3 % (ref 0–1)
BUN SERPL-MCNC: 6 MG/DL (ref 7–26)
BUN/CREAT SERPL: 10 (ref 6–25)
CALCIUM SERPL-MCNC: 7.6 MG/DL (ref 8.4–10.2)
CHLORIDE SERPL-SCNC: 98 MMOL/L (ref 98–107)
CO2 SERPL-SCNC: 24 MMOL/L (ref 22–29)
DEPRECATED NEUTROPHILS # BLD AUTO: 12.8 10*3/UL (ref 2.1–6.9)
EGFRCR SERPLBLD CKD-EPI 2021: > 60 ML/MIN (ref 60–?)
EOSINOPHIL # BLD AUTO: 0.2 10*3/UL (ref 0–0.4)
EOSINOPHIL NFR BLD AUTO: 1.5 % (ref 0–6)
ERYTHROCYTE [DISTWIDTH] IN CORD BLOOD: 14.3 % (ref 11.7–14.4)
GLUCOSE SERPLBLD-MCNC: 130 MG/DL (ref 74–118)
HCT VFR BLD AUTO: 32.5 % (ref 38.2–49.6)
HGB BLD-MCNC: 10.9 G/DL (ref 14–18)
LYMPHOCYTES # BLD: 1.1 10*3/UL (ref 1–3.2)
LYMPHOCYTES NFR BLD AUTO: 7.2 % (ref 18–39.1)
MCH RBC QN AUTO: 31.2 PG (ref 28–32)
MCHC RBC AUTO-ENTMCNC: 33.5 G/DL (ref 31–35)
MCV RBC AUTO: 93.1 FL (ref 81–99)
MONOCYTES # BLD AUTO: 1.1 10*3/UL (ref 0.2–0.8)
MONOCYTES NFR BLD AUTO: 6.8 % (ref 4.4–11.3)
NEUTS SEG NFR BLD AUTO: 83.7 % (ref 38.7–80)
PLATELET # BLD AUTO: 323 X10E3/UL (ref 140–360)
POTASSIUM SERPL-SCNC: 3.5 MMOL/L (ref 3.5–5.1)
RBC # BLD AUTO: 3.49 X10E6/UL (ref 4.3–5.7)
SODIUM SERPL-SCNC: 130 MMOL/L (ref 136–145)

## 2020-02-14 RX ADMIN — SODIUM CHLORIDE SCH ML: 900 IRRIGANT IRRIGATION at 22:30

## 2020-02-14 RX ADMIN — METRONIDAZOLE SCH MLS/HR: 500 INJECTION, SOLUTION INTRAVENOUS at 14:02

## 2020-02-14 RX ADMIN — AZTREONAM SCH MLS/HR: 1 INJECTION, POWDER, FOR SOLUTION INTRAMUSCULAR; INTRAVENOUS at 00:59

## 2020-02-14 RX ADMIN — SODIUM CHLORIDE SCH ML: 900 IRRIGANT IRRIGATION at 06:31

## 2020-02-14 RX ADMIN — SODIUM CHLORIDE SCH ML: 900 IRRIGANT IRRIGATION at 09:52

## 2020-02-14 RX ADMIN — AZTREONAM SCH MLS/HR: 1 INJECTION, POWDER, FOR SOLUTION INTRAMUSCULAR; INTRAVENOUS at 17:51

## 2020-02-14 RX ADMIN — Medication PRN MG: at 20:11

## 2020-02-14 RX ADMIN — AZTREONAM SCH MLS/HR: 1 INJECTION, POWDER, FOR SOLUTION INTRAMUSCULAR; INTRAVENOUS at 08:44

## 2020-02-14 RX ADMIN — DEXTROSE AND SODIUM CHLORIDE SCH MLS/HR: 5; 450 INJECTION, SOLUTION INTRAVENOUS at 03:30

## 2020-02-14 RX ADMIN — SODIUM CHLORIDE SCH ML: 900 IRRIGANT IRRIGATION at 14:04

## 2020-02-14 RX ADMIN — METRONIDAZOLE SCH MLS/HR: 500 INJECTION, SOLUTION INTRAVENOUS at 21:31

## 2020-02-14 RX ADMIN — SODIUM CHLORIDE SCH ML: 900 IRRIGANT IRRIGATION at 17:51

## 2020-02-14 RX ADMIN — SODIUM CHLORIDE SCH ML: 900 IRRIGANT IRRIGATION at 02:30

## 2020-02-14 RX ADMIN — METRONIDAZOLE SCH MLS/HR: 500 INJECTION, SOLUTION INTRAVENOUS at 05:49

## 2020-02-14 RX ADMIN — Medication PRN MG: at 06:15

## 2020-02-14 RX ADMIN — Medication PRN MG: at 14:04

## 2020-02-14 RX ADMIN — ENOXAPARIN SODIUM SCH MG: 40 INJECTION SUBCUTANEOUS at 19:09

## 2020-02-14 NOTE — NUR
RECEIVED CALL BACK FROM DR. JONES.  

STATES HE WILL LEAVE THE DISPO DECISION UP TO SURGERY. 



CM WILL CONT TO FOLLOW.

## 2020-02-14 NOTE — NUR
PT DISCUSSED IN MDR.



CALL TO DR. JONES FOR DC PLAN FOR THE PT.  NO ANSWER; LEFT MESSAGE.

PT IS S/P LAP W INTESTINAL BYPASS; IV ABX, IV HYDRATION, TPN, NGT.  PT HAS BEEN UP W PT 
AMBULATED IN THE UNIT.

DISCUSSED LTAC VS INPT REHAB.

## 2020-02-14 NOTE — NUR
IM- progress note

O/N no evens

ROS: no f/c/s/HA/cp/confusion/focal limb weakness/dizziness/vision changes



v/s revd

PE

tired appearing

facial asymmetry

ns1s2

mod bs

soft; mild tender in mid abdomen

no e/t

skin dry

flat affect

a&ox3; moe



labs/med revd



A/P: 74yoM

SBO

Pancreastic adenocarcinoma

Diverticulosis

Cholelithiasis

Right lung nodule

GERD

HTN

FOrmer smoker



PLAN

Place NGT; IVF; NPO; antiemetics; NGT; Sx consult; GI eval; 

IV ppi and SCD; 



2/10 ambulate; sx eval; check lytes

2/11 check labs

2/12 cont care; check labs; f/u sx plan; Ambulate; optimize lytes

2/13 s/p surgery; check labs

2/14 check labs; started on TPN



Lane Hardin MD, PhD.

## 2020-02-14 NOTE — NUR
Nutrition Intervention Note



RD Recommendation(s) for Physician: 



Recommend standard TPN @ goal rate of 75 mL/hr and 25 gm/day lipids  (total volume 1800 mL, 
Dextrose 30% 500 mL (270 g/day), AA 10% 500 mL (90 g/day), 25 gm/day lipids with standard 
electrolytes, trace elements, and multivitamins) -provides 1503 kcal and 90 g protein

-Additional additives per MD



Nutrition reason for involvement: follow up, new TPN



RD Assessment

2/14: Follow up. Pt continues to have NGT in place and was started on TPN last night. TPN 
recommendation was provided to RN. Will continue to monitor.

 

2/12: 74 YOM admitted for SBO with PMH listed below. The pt was seen resting in bed, NGT in 
place with daughter at bedside. Spoke about pt in rounds, per nurse he is going for surgery 
today (intestinal bypass of point of obstruction) and MD may initiate TPN after surgery but 
was still deciding, please consult RD if decision is made to start TPN. Pt reported he has 
been eating well at home despite losing weight d/t his chemo. He reported he lost 10-15 lbs 
in the past 2-3 months.  Pt was here in Oct 2019, pt was 170 lbs at that suggesting a 3% 
weight loss within 3 months which is not significant enough to meet ASPEN malnutrition 
criteria. He denied chewing or swallowing issues as well as any food allergies. Will 
continue to monitor. 



Principal Problems/Diagnoses: SBO



PMH: Significant for inoperable advanced pancreatic carcinoma, hypertension, coronary artery 
disease.



GI: Abd: soft, tender LBM: 2/11



Skin: no pressure ulcer recorded 



Labs:  2/14: Na 130, BUN 6, Creat 0.58, Glu 130, Ca 7.6

2/12:Na 136, K 3.9, Cl 102, CO2 26, BUN 19, Creat 0.73, Gluc 104, Ca 8.1, Phos 3.1, Mg 1.9, 
Tbili 0.7 



Meds: aztreonam, metroprolol, zofran, NaCl, morphine, KCl



Ht:65 in (not 60 inches)          

Wt: 165 lbs      

BMI: 27.5 kg/m^2          

IBW: 136lbs 



Malnutrition Evaluation (2/12/20) 

The patient does not meet criteria for a specified degree of malnutrition at this time. Will 
re-evaluate at follow-up as appropriate. 



Energy intake:-pt denies poor oral intake

Weight loss:. He reported he lost 10-15 lbs in the past 2-3 months.  Pt was here in Oct 
2018, pt was 170 lbs at that suggesting a 3% weight loss within 3 months which is not 
significant enough to meet ASPEN malnutrition criteria

Fat loss: Mild- dark eyes 

Muscle loss: Mild-temporal wasting  



Fluid accumulation: No cyanosis or edema.

Functional Status: unable to evaluate





Nutrition Prescription (Diet Order): Standard TPN @ 50 mL/hr and 25 g/day lipids (total 
volume 1200 mL, Dextrose 30% 500 mL (180 g/day), AA 10% 500 mL/ (60 g/day), 25 gm/day lipids 
with standard electrolytes, trace elements, and multivitamins)

-provides 1077 kcal and 60 g protein



Estimated Nutritional Needs:

Calories: 7921-4556 kcal/day (18-22  kcal/kg/day) Weight used : 75 kg CBW

Protein :  g protein/day (1-1.5 gram/kg/day ) Weight used: 75 kg CBW



Diet Adequacy: Not meeting calorie needs, Not meeting protein needs



Diet Education Needs Assessment: Diet education not indicated, patient on 
temporary/transition diet.



Nutrition Care Level: high 



Nutrition Diagnosis: Altered GI function related to SBO as evidenced by need for TPN



Goal: Patient will meet % of estimated needs by follow up 



Progress: progressing



Interventions:

- TPN - Rate, Route, Collaboration with other providers,



Monitoring/Evaluation: 

Total energy intake, Total protein intake, Formula/Solution, Weight change



Signed: Indira Cota RD, LD

## 2020-02-15 VITALS — DIASTOLIC BLOOD PRESSURE: 90 MMHG | SYSTOLIC BLOOD PRESSURE: 133 MMHG

## 2020-02-15 VITALS — DIASTOLIC BLOOD PRESSURE: 103 MMHG | SYSTOLIC BLOOD PRESSURE: 148 MMHG

## 2020-02-15 VITALS — SYSTOLIC BLOOD PRESSURE: 160 MMHG | DIASTOLIC BLOOD PRESSURE: 97 MMHG

## 2020-02-15 VITALS — SYSTOLIC BLOOD PRESSURE: 131 MMHG | DIASTOLIC BLOOD PRESSURE: 84 MMHG

## 2020-02-15 VITALS — DIASTOLIC BLOOD PRESSURE: 92 MMHG | SYSTOLIC BLOOD PRESSURE: 139 MMHG

## 2020-02-15 VITALS — DIASTOLIC BLOOD PRESSURE: 90 MMHG | SYSTOLIC BLOOD PRESSURE: 146 MMHG

## 2020-02-15 VITALS — SYSTOLIC BLOOD PRESSURE: 148 MMHG | DIASTOLIC BLOOD PRESSURE: 94 MMHG

## 2020-02-15 VITALS — SYSTOLIC BLOOD PRESSURE: 147 MMHG | DIASTOLIC BLOOD PRESSURE: 87 MMHG

## 2020-02-15 VITALS — DIASTOLIC BLOOD PRESSURE: 101 MMHG | SYSTOLIC BLOOD PRESSURE: 142 MMHG

## 2020-02-15 VITALS — SYSTOLIC BLOOD PRESSURE: 138 MMHG | DIASTOLIC BLOOD PRESSURE: 95 MMHG

## 2020-02-15 VITALS — SYSTOLIC BLOOD PRESSURE: 149 MMHG | DIASTOLIC BLOOD PRESSURE: 103 MMHG

## 2020-02-15 VITALS — DIASTOLIC BLOOD PRESSURE: 99 MMHG | SYSTOLIC BLOOD PRESSURE: 139 MMHG

## 2020-02-15 LAB
ALBUMIN SERPL-MCNC: 2.1 G/DL (ref 3.5–5)
ALBUMIN/GLOB SERPL: 0.6 {RATIO} (ref 0.8–2)
ALP SERPL-CCNC: 69 IU/L (ref 40–150)
ALT SERPL-CCNC: 9 IU/L (ref 0–55)
ANION GAP SERPL CALC-SCNC: 10.8 MMOL/L (ref 8–16)
ANION GAP SERPL CALC-SCNC: 13 MMOL/L (ref 8–16)
BASOPHILS # BLD AUTO: 0.1 10*3/UL (ref 0–0.1)
BASOPHILS NFR BLD AUTO: 0.5 % (ref 0–1)
BILIRUB CONJ SERPL-MCNC: 0.2 MG/DL (ref 0–0.5)
BUN SERPL-MCNC: 10 MG/DL (ref 7–26)
BUN SERPL-MCNC: 9 MG/DL (ref 7–26)
BUN/CREAT SERPL: 16 (ref 6–25)
BUN/CREAT SERPL: 16 (ref 6–25)
CALCIUM SERPL-MCNC: 7.9 MG/DL (ref 8.4–10.2)
CALCIUM SERPL-MCNC: 7.9 MG/DL (ref 8.4–10.2)
CHLORIDE SERPL-SCNC: 97 MMOL/L (ref 98–107)
CHLORIDE SERPL-SCNC: 98 MMOL/L (ref 98–107)
CO2 SERPL-SCNC: 23 MMOL/L (ref 22–29)
CO2 SERPL-SCNC: 24 MMOL/L (ref 22–29)
DEPRECATED NEUTROPHILS # BLD AUTO: 10.1 10*3/UL (ref 2.1–6.9)
EGFRCR SERPLBLD CKD-EPI 2021: > 60 ML/MIN (ref 60–?)
EGFRCR SERPLBLD CKD-EPI 2021: > 60 ML/MIN (ref 60–?)
EOSINOPHIL # BLD AUTO: 0.4 10*3/UL (ref 0–0.4)
EOSINOPHIL NFR BLD AUTO: 3.1 % (ref 0–6)
ERYTHROCYTE [DISTWIDTH] IN CORD BLOOD: 14.4 % (ref 11.7–14.4)
GLOBULIN PLAS-MCNC: 3.4 G/DL (ref 2.3–3.5)
GLUCOSE SERPLBLD-MCNC: 123 MG/DL (ref 74–118)
GLUCOSE SERPLBLD-MCNC: 127 MG/DL (ref 74–118)
HCT VFR BLD AUTO: 32.9 % (ref 38.2–49.6)
HCT VFR BLD AUTO: 34.2 % (ref 38.2–49.6)
HGB BLD-MCNC: 11.1 G/DL (ref 14–18)
HGB BLD-MCNC: 11.5 G/DL (ref 14–18)
LYMPHOCYTES # BLD: 1.4 10*3/UL (ref 1–3.2)
LYMPHOCYTES NFR BLD AUTO: 10.2 % (ref 18–39.1)
MCH RBC QN AUTO: 31.1 PG (ref 28–32)
MCHC RBC AUTO-ENTMCNC: 33.7 G/DL (ref 31–35)
MCV RBC AUTO: 92.2 FL (ref 81–99)
MONOCYTES # BLD AUTO: 1.3 10*3/UL (ref 0.2–0.8)
MONOCYTES NFR BLD AUTO: 9.6 % (ref 4.4–11.3)
NEUTS SEG NFR BLD AUTO: 76.1 % (ref 38.7–80)
PLATELET # BLD AUTO: 308 X10E3/UL (ref 140–360)
POTASSIUM SERPL-SCNC: 3.8 MMOL/L (ref 3.5–5.1)
POTASSIUM SERPL-SCNC: 4 MMOL/L (ref 3.5–5.1)
RBC # BLD AUTO: 3.57 X10E6/UL (ref 4.3–5.7)
SODIUM SERPL-SCNC: 128 MMOL/L (ref 136–145)
SODIUM SERPL-SCNC: 130 MMOL/L (ref 136–145)
TRIGL SERPL-MCNC: 46 MG/DL (ref 0–149)

## 2020-02-15 RX ADMIN — AZTREONAM SCH MLS/HR: 1 INJECTION, POWDER, FOR SOLUTION INTRAMUSCULAR; INTRAVENOUS at 00:36

## 2020-02-15 RX ADMIN — AZTREONAM SCH MLS/HR: 1 INJECTION, POWDER, FOR SOLUTION INTRAMUSCULAR; INTRAVENOUS at 17:45

## 2020-02-15 RX ADMIN — ENOXAPARIN SODIUM SCH MG: 40 INJECTION SUBCUTANEOUS at 17:45

## 2020-02-15 RX ADMIN — SODIUM CHLORIDE PRN MG: 900 INJECTION INTRAVENOUS at 08:17

## 2020-02-15 RX ADMIN — PROMETHAZINE HYDROCHLORIDE PRN MG: 25 INJECTION, SOLUTION INTRAMUSCULAR; INTRAVENOUS at 15:50

## 2020-02-15 RX ADMIN — AZTREONAM SCH MLS/HR: 1 INJECTION, POWDER, FOR SOLUTION INTRAMUSCULAR; INTRAVENOUS at 08:26

## 2020-02-15 RX ADMIN — Medication PRN MG: at 21:46

## 2020-02-15 RX ADMIN — SODIUM CHLORIDE SCH ML: 900 IRRIGANT IRRIGATION at 08:02

## 2020-02-15 RX ADMIN — VANCOMYCIN HYDROCHLORIDE SCH MLS/HR: 1 INJECTION, SOLUTION INTRAVENOUS at 18:15

## 2020-02-15 RX ADMIN — Medication PRN MG: at 17:46

## 2020-02-15 RX ADMIN — Medication PRN MG: at 00:39

## 2020-02-15 RX ADMIN — SODIUM CHLORIDE PRN MG: 900 INJECTION INTRAVENOUS at 20:18

## 2020-02-15 RX ADMIN — METRONIDAZOLE SCH MLS/HR: 500 INJECTION, SOLUTION INTRAVENOUS at 05:59

## 2020-02-15 RX ADMIN — SODIUM CHLORIDE SCH MLS/HR: 9 INJECTION, SOLUTION INTRAVENOUS at 08:40

## 2020-02-15 RX ADMIN — Medication PRN MG: at 08:41

## 2020-02-15 RX ADMIN — SODIUM CHLORIDE PRN MG: 900 INJECTION INTRAVENOUS at 15:01

## 2020-02-15 RX ADMIN — METRONIDAZOLE SCH MLS/HR: 500 INJECTION, SOLUTION INTRAVENOUS at 14:20

## 2020-02-15 RX ADMIN — SODIUM CHLORIDE SCH ML: 900 IRRIGANT IRRIGATION at 02:30

## 2020-02-15 RX ADMIN — METOPROLOL TARTRATE SCH MG: 25 TABLET, FILM COATED ORAL at 17:45

## 2020-02-15 RX ADMIN — METOPROLOL TARTRATE SCH MG: 25 TABLET, FILM COATED ORAL at 22:43

## 2020-02-15 RX ADMIN — METRONIDAZOLE SCH MLS/HR: 500 INJECTION, SOLUTION INTRAVENOUS at 21:46

## 2020-02-15 NOTE — DIAGNOSTIC IMAGING REPORT
Examination: Single AP view of the chest.



COMPARISON: None.



INDICATION: Decreased breath sounds

     

DISCUSSION:



Right internal jugular central venous catheter tip terminates over the expected

region of the upper right atrium.



 Lung volumes are low with perihilar and bibasilar opacities. Small bilateral

pleural effusions are suspected. Lung apices are comparatively well aerated.



 Cardiomediastinal contour and pulmonary vasculature are within normal limits

when accounting for AP technique.



 No acute osseous abnormalities.



IMPRESSION:

 

Low lung volumes with vascular crowding or subsegmental atelectasis in the

perihilar regions and lung bases.



Suspected small bilateral pleural effusions.



Signed by: Dr. Abraham Penn M.D. on 2/15/2020 5:54 PM

## 2020-02-15 NOTE — NUR
Dr Alvarez and Dr Hardin notified of suspected SIRS.  Elevated temp to 99.3, , RR 24.  No 
new orders currently.  Will monitor. 

-------------------------------------------------------------------------------

Addendum: 02/16/20 at 1628 by Denita Hodgson RN

-------------------------------------------------------------------------------

The pt remains mildly confused as has been entire shift. He denies abdominal pain, no  
distention to the abdomen.  Pt is only getting 500 on the IS but was achieving 1000 
yesterday. Pt has declined PT with reported weakness.  Repeat labs unremarkable.  Have 
spoken with Dr Alvarez who declined a sirs work up but gave an order to remove the NGT.  Co 
worker CHRISTINE Mcgill RN spoke with Dr Hardin who declined a SIRS workup.

## 2020-02-15 NOTE — DIAGNOSTIC IMAGING REPORT
EXAM: Abdomen Radiograph 1  View

INDICATION: Indeterminate insertion, verify position , history of small bowel

obstruction 



COMPARISON: Chest x-ray 2/15/2020, abdominal radiograph 2/12/2020



FINDINGS:





New enteric tube, tip projects in the left upper abdomen.



Partially visualized right superior approach central venous catheter, tip in

the mid SVC.



Bibasilar pulmonary opacities likely atelectasis.



Normal volume of stool in the colon.



No dilated loops of small bowel.



No abnormal abdominal calcifications..



No abnormal soft tissue masses.



No pneumoperitoneum.



No acute osseous abnormality.



Degenerative changes in the spine.



IMPRESSION:



New enteric tube, tip projects in the left upper abdomen, likely within the

gastric lumen.



Bibasilar pulmonary opacities likely due to can be due to atelectasis, pleural

effusion, and/or pneumonia/aspiration.



Signed by: Dat Colon DO on 2/15/2020 10:17 PM

## 2020-02-15 NOTE — CONSULTATION
DATE OF CONSULTATION:  

 

Pulmonary Critical Care Consultation 

 

CHIEF COMPLAINT:  Tachycardia and fever.

 

HISTORY OF PRESENT ILLNESS:  The patient is a 74-year-old man.  He has a history of

adenocarcinoma of the pancreas.  He was receiving chemotherapy.  He also had intestinal

implant and known mesenteric mets.  He was recently hospitalized at another hospital for

small-bowel obstruction.  This resolved without surgery.  He subsequently came to the

hospital again with abdominal pain and cramping.  He noted no bowel movements and some

vomiting.  CT scan of the abdomen and pelvis showed a small bowel obstruction as well as

mesenteric metastases and invasion of the primary lesion into the area of the mesenteric

vein and superior mesenteric artery. 

 

On the 13th, the patient went to the OR and had an intestinal bypass.  He tolerated this

procedure well.  He was on the [facility] receiving total parental nutrition.

This afternoon he had some increased pain.  He became more tachycardic with a heart rate

of 120 to 130 and had a temperature of 101.  He was subsequently transferred to the

intensive care unit.  Repeat blood cultures were drawn as well as a lactic acid.  He

received some pain medication. 

 

PAST SURGICAL HISTORY:  

1. Status post appendectomy.

2. Status post recent intestinal bypass as noted above.

 

PAST MEDICAL HISTORY:  Advanced adenocarcinoma of the pancreas with mesenteric mets and

some possible involvement of the superior mesenteric vein and artery from the primary

lesion. 

 

ALLERGIES:  THE PATIENT IS ALLERGIC TO PENICILLIN.

 

SOCIAL HISTORY:  The patient is not a drinker or smoker.

 

FAMILY HISTORY:  Family history is noncontributory.

 

REVIEW OF SYSTEMS:

The patient did have a fever as noted above.  He had no headache.  He has no neck pain.

He is not having any chest pain.  He denies any difficulty breathing.  He has no cough

or phlegm production.  He does have some abdominal pain.  He is not having any vomiting

now.  He has no leg edema. 

 

PHYSICAL EXAMINATION:

VITAL SIGNS:  The patient's heart rate is 120.  Blood pressure is 148/103 and the

saturation is 95% on room air. 

HEENT:  Shows no facial swelling or erythema.  There is a right IJ line in good

position.  The site looks clean. 

CARDIAC:  Reveals regular rate and rhythm with normal S1 and S2. 

LUNGS:  Auscultation of lungs shows decreased breath sounds at the bases.  There is no

wheezing. 

ABDOMEN:  Soft.  There is some mild distention.  There is a bandage over the operative

site. 

EXTREMITIES:  No leg edema.  He has no calf tenderness.  There is no cyanosis or

clubbing. 

NEUROLOGICAL:  No focal abnormalities.

LABORATORY DATA:  White blood cell count is 13.3 and hemoglobin is 11.5.  The platelet

count is 308.  Sodium is 130.  BUN to creatinine ratio is 10 to 0.62.  The other

electrolytes within normal limits.  PT is 16.4 and the INR is 1.24. 

 

RADIOGRAPHIC DATA:  Chest x-ray shows poor inspiratory effort with some subsegmental

atelectasis.  There may be small bilateral pleural effusions. 

 

IMPRESSION:  

1. Fever on postoperative day two.

2. Metastatic adenocarcinoma of the pancreas.

3. Sinus tachycardia.

4. Hypertension.

 

PLAN:  

1. The patient will receive additional fluids.

2. We have added vancomycin to the patient's Azactam and Flagyl.

3. New cultures were obtained and are pending.

4. Lactic acid is pending.

5. Morphine for pain control.

6. Echocardiogram.

7. Case discussed with the patient and nursing staff.

 

 

 

 

______________________________

Erick Humphrey MD

 

[facility]

D:  02/15/2020 18:48:03

T:  02/15/2020 21:44:52

Job #:  [id number]

## 2020-02-15 NOTE — NUR
Dr Hardin and Dr Alvarez at bedside.  Notified of nausea, ngt turned to suction with result of 
immediate dark gastric secretions in amount of 450cc.  Follow up h/h added to noon labs.  
Will monitor.

## 2020-02-15 NOTE — NUR
IM- progress note

O/N no evens

ROS: no f/c/s/HA/cp/confusion/focal limb weakness/dizziness/vision changes



v/s revd

PE

tired appearing

facial asymmetry

ns1s2

mod bs

soft; mild tender in mid abdomen

no e/t

skin dry

flat affect

a&ox3; moe



labs/med revd



A/P: 74yoM

SBO

Pancreastic adenocarcinoma

Diverticulosis

Cholelithiasis

Right lung nodule

GERD

HTN

FOrmer smoker



PLAN

Place NGT; IVF; NPO; antiemetics; NGT; Sx consult; GI eval; 

IV ppi and SCD; 



2/10 ambulate; sx eval; check lytes

2/11 check labs

2/12 cont care; check labs; f/u sx plan; Ambulate; optimize lytes

2/13 s/p surgery; check labs

2/14 check labs; started on TPN

2/15 use NS for Hyponatremia; cont care 



Lane Hardin MD, PhD.

## 2020-02-15 NOTE — NUR
Dr Hardin notified of temp 101.8, , RR 24.  Orders received including to consult Dr MARQUIS Humphrey.  Dr Alvarez notified.

## 2020-02-15 NOTE — NUR
Pt vomiting dark brown, foul smelling emesis. Call to Dr. Alvarez, ordered to put NG tube back 
in and connect to LIS.

## 2020-02-16 VITALS — SYSTOLIC BLOOD PRESSURE: 136 MMHG | DIASTOLIC BLOOD PRESSURE: 105 MMHG

## 2020-02-16 VITALS — SYSTOLIC BLOOD PRESSURE: 118 MMHG | DIASTOLIC BLOOD PRESSURE: 81 MMHG

## 2020-02-16 VITALS — DIASTOLIC BLOOD PRESSURE: 82 MMHG | SYSTOLIC BLOOD PRESSURE: 130 MMHG

## 2020-02-16 VITALS — DIASTOLIC BLOOD PRESSURE: 89 MMHG | SYSTOLIC BLOOD PRESSURE: 130 MMHG

## 2020-02-16 VITALS — DIASTOLIC BLOOD PRESSURE: 88 MMHG | SYSTOLIC BLOOD PRESSURE: 133 MMHG

## 2020-02-16 VITALS — SYSTOLIC BLOOD PRESSURE: 124 MMHG | DIASTOLIC BLOOD PRESSURE: 80 MMHG

## 2020-02-16 VITALS — SYSTOLIC BLOOD PRESSURE: 125 MMHG | DIASTOLIC BLOOD PRESSURE: 76 MMHG

## 2020-02-16 LAB
ALBUMIN SERPL-MCNC: 2 G/DL (ref 3.5–5)
ALBUMIN/GLOB SERPL: 0.6 {RATIO} (ref 0.8–2)
ALP SERPL-CCNC: 62 IU/L (ref 40–150)
ALT SERPL-CCNC: 9 IU/L (ref 0–55)
ANION GAP SERPL CALC-SCNC: 10.7 MMOL/L (ref 8–16)
BASOPHILS # BLD AUTO: 0 10*3/UL (ref 0–0.1)
BASOPHILS NFR BLD AUTO: 0.3 % (ref 0–1)
BUN SERPL-MCNC: 17 MG/DL (ref 7–26)
BUN/CREAT SERPL: 29 (ref 6–25)
CALCIUM SERPL-MCNC: 7.5 MG/DL (ref 8.4–10.2)
CHLORIDE SERPL-SCNC: 99 MMOL/L (ref 98–107)
CO2 SERPL-SCNC: 27 MMOL/L (ref 22–29)
DEPRECATED NEUTROPHILS # BLD AUTO: 11.3 10*3/UL (ref 2.1–6.9)
DEPRECATED PHOSPHATE SERPL-MCNC: 2.2 MG/DL (ref 2.3–4.7)
EGFRCR SERPLBLD CKD-EPI 2021: > 60 ML/MIN (ref 60–?)
EOSINOPHIL # BLD AUTO: 0.1 10*3/UL (ref 0–0.4)
EOSINOPHIL NFR BLD AUTO: 0.6 % (ref 0–6)
ERYTHROCYTE [DISTWIDTH] IN CORD BLOOD: 14.7 % (ref 11.7–14.4)
GLOBULIN PLAS-MCNC: 3.1 G/DL (ref 2.3–3.5)
GLUCOSE SERPLBLD-MCNC: 131 MG/DL (ref 74–118)
HCT VFR BLD AUTO: 28.6 % (ref 38.2–49.6)
HGB BLD-MCNC: 9.4 G/DL (ref 14–18)
LYMPHOCYTES # BLD: 0.7 10*3/UL (ref 1–3.2)
LYMPHOCYTES NFR BLD AUTO: 5.5 % (ref 18–39.1)
MAGNESIUM SERPL-MCNC: 2.1 MG/DL (ref 1.3–2.1)
MCH RBC QN AUTO: 30.8 PG (ref 28–32)
MCHC RBC AUTO-ENTMCNC: 32.9 G/DL (ref 31–35)
MCV RBC AUTO: 93.8 FL (ref 81–99)
MONOCYTES # BLD AUTO: 1 10*3/UL (ref 0.2–0.8)
MONOCYTES NFR BLD AUTO: 7.7 % (ref 4.4–11.3)
NEUTS SEG NFR BLD AUTO: 85.4 % (ref 38.7–80)
PLATELET # BLD AUTO: 283 X10E3/UL (ref 140–360)
POTASSIUM SERPL-SCNC: 3.7 MMOL/L (ref 3.5–5.1)
RBC # BLD AUTO: 3.05 X10E6/UL (ref 4.3–5.7)
SODIUM SERPL-SCNC: 133 MMOL/L (ref 136–145)

## 2020-02-16 RX ADMIN — AZTREONAM SCH MLS/HR: 1 INJECTION, SOLUTION INTRAVENOUS at 00:19

## 2020-02-16 RX ADMIN — METOPROLOL TARTRATE SCH MG: 25 TABLET, FILM COATED ORAL at 12:06

## 2020-02-16 RX ADMIN — Medication PRN MG: at 21:17

## 2020-02-16 RX ADMIN — AZTREONAM SCH MLS/HR: 1 INJECTION, SOLUTION INTRAVENOUS at 10:04

## 2020-02-16 RX ADMIN — Medication PRN MG: at 13:53

## 2020-02-16 RX ADMIN — AZTREONAM SCH MLS/HR: 1 INJECTION, SOLUTION INTRAVENOUS at 17:38

## 2020-02-16 RX ADMIN — FAMOTIDINE SCH MG: 10 INJECTION, SOLUTION INTRAVENOUS at 17:38

## 2020-02-16 RX ADMIN — METOPROLOL TARTRATE SCH MG: 25 TABLET, FILM COATED ORAL at 04:55

## 2020-02-16 RX ADMIN — METRONIDAZOLE SCH MLS/HR: 500 INJECTION, SOLUTION INTRAVENOUS at 21:17

## 2020-02-16 RX ADMIN — METRONIDAZOLE SCH MLS/HR: 500 INJECTION, SOLUTION INTRAVENOUS at 13:21

## 2020-02-16 RX ADMIN — VANCOMYCIN HYDROCHLORIDE SCH MLS/HR: 1 INJECTION, SOLUTION INTRAVENOUS at 18:17

## 2020-02-16 RX ADMIN — METOPROLOL TARTRATE SCH MG: 25 TABLET, FILM COATED ORAL at 18:16

## 2020-02-16 RX ADMIN — METRONIDAZOLE SCH MLS/HR: 500 INJECTION, SOLUTION INTRAVENOUS at 05:06

## 2020-02-16 RX ADMIN — SODIUM CHLORIDE SCH MLS/HR: 9 INJECTION, SOLUTION INTRAVENOUS at 00:19

## 2020-02-16 RX ADMIN — PROMETHAZINE HYDROCHLORIDE PRN MG: 25 INJECTION, SOLUTION INTRAMUSCULAR; INTRAVENOUS at 04:46

## 2020-02-16 RX ADMIN — FAMOTIDINE SCH MG: 10 INJECTION, SOLUTION INTRAVENOUS at 10:04

## 2020-02-16 NOTE — DIAGNOSTIC IMAGING REPORT
Examination: Single AP view of the chest.



COMPARISON: 2/15/2020.



INDICATION: Small bowel obstruction.

     

DISCUSSION:



Right internal jugular central venous catheter tip terminates over the expected

region of the upper right atrium, unchanged. NG tube distal tip projected on

the gastric body region.



 Lung volumes are low with perihilar and bibasilar opacities. Small bilateral

pleural effusions are suspected.  Bilateral pulmonary venous congestion.



 The cardiac silhouette is mildly enlarged.



 No acute osseous abnormalities.



IMPRESSION:

 

Bibasilar atelectasis. Bilateral small pleural effusions.



Bilateral pulmonary venous congestion.



Signed by: Dr. BLANCA Garcia M.D. on 2/16/2020 8:01 AM

## 2020-02-16 NOTE — NUR
IM- progress note

O/N no evens

ROS: no f/c/s/HA/cp/confusion/focal limb weakness/dizziness/vision changes



v/s revd

PE

tired appearing

facial asymmetry

ns1s2

mod bs

soft; mild tender in mid abdomen

no e/t

skin dry

flat affect

a&ox3; moe



labs/med revd



A/P: 74yoM

SBO

Pancreastic adenocarcinoma

Diverticulosis

Cholelithiasis

Right lung nodule

GERD

HTN

FOrmer smoker



PLAN

Place NGT; IVF; NPO; antiemetics; NGT; Sx consult; GI eval; 

IV ppi and SCD; 



2/10 ambulate; sx eval; check lytes

2/11 check labs

2/12 cont care; check labs; f/u sx plan; Ambulate; optimize lytes

2/13 s/p surgery; check labs

2/14 check labs; started on TPN

2/15 use NS for Hyponatremia; cont care 

2/16 Sepsis- got vanco; fevers resolving; Na improving; cont IVF and broad spec 
abx; vanco trough pending cct>35mins

Lane Hardin MD, PhD.

## 2020-02-16 NOTE — NUR
Jarrett Hardin and Antonio notified of the positive guaiac of gastric drainage.  Ngt output 
decreased to 25/ml.  OK to start ice and water only, with continued ILWS.

## 2020-02-16 NOTE — PROGRESS NOTE
DATE:    

 

SUBJECTIVE:  The patient had some vomiting.  He has less abdominal discomfort. 

 

He received extra fluid yesterday.

 

PHYSICAL EXAMINATION:

VITAL SIGNS:  The patient is afebrile.  The vital signs are stable. 

HEENT:  Shows no facial swelling or erythema. 

CARDIAC:  Reveals regular rate and rhythm with normal S1, S2. 

LUNGS:  Auscultation of lungs shows clear breath sounds bilaterally.  There is no

wheezing. 

ABDOMEN:  Soft, nontender.  There is no rebound or guarding. 

EXTREMITIES:  Show no leg edema or calf tenderness.

IMPRESSION:  

1. Small bowel obstruction with intestinal bypass.

2. Metastatic adenocarcinoma of the prostate.

3. Moderate protein-calorie malnutrition.

4. Hypertension.

5. Postoperative fever.

 

PLAN:  

1. Continue TPN.

2. Continue IV fluids.

3. Continue NG tube for now until discontinued by Surgery.

4. Await culture results.  Taper antibiotics depending on culture results.

 

 

 

 

______________________________

Erick Humphrey MD [facility]

D:  02/16/2020 13:21:43

T:  02/16/2020 16:01:40

Job #:  [id number]

## 2020-02-16 NOTE — NUR
Pt started complaining of gastric reflux symptoms, pt immediately started projectile 
vomiting dark brown emesis (~500). NG tube (previously said in correct position per XRAY) 
advanced output started draining. NG tube stopped draining again, NG tube flushed and 500 
mls drained. Xray ordered to confirm placement again. Phenergan given, pt stated he felt a 
little better. Complete bed bath given with linen change. Central line dressing changed. Pt 
resting comfortably.

## 2020-02-16 NOTE — DIAGNOSTIC IMAGING REPORT
EXAM:  ABDOMEN-1VIEW (KUB),    

DATE: 2/16/2020 4:55 AM  

INDICATION: Bowel obstruction. Follow-up.

COMPARISON: 2/15/2020



FINDINGS:

LINES/TUBES: 

Partially visualized right superior approach central venous catheter, tip in

the mid SVC. N G-tube with distal tip projected on the gastric antrum region.



BOWEL PATTERN: Mild gaseous distention of colon and small bowel loops in the

upper abdomen. The pelvis was not included.



SOFT TISSUES: No abnormal calcifications. No mass effect.



LUNG BASES: Bibasilar atelectasis.



BONES: No acute findings.



IMPRESSION:

No interval change in partially visualized bowel gas pattern.











 



Signed by: Dr. BLANCA Garcia M.D. on 2/16/2020 8:03 AM

## 2020-02-17 VITALS — SYSTOLIC BLOOD PRESSURE: 152 MMHG | DIASTOLIC BLOOD PRESSURE: 85 MMHG

## 2020-02-17 VITALS — SYSTOLIC BLOOD PRESSURE: 126 MMHG | DIASTOLIC BLOOD PRESSURE: 86 MMHG

## 2020-02-17 VITALS — DIASTOLIC BLOOD PRESSURE: 84 MMHG | SYSTOLIC BLOOD PRESSURE: 140 MMHG

## 2020-02-17 VITALS — DIASTOLIC BLOOD PRESSURE: 78 MMHG | SYSTOLIC BLOOD PRESSURE: 137 MMHG

## 2020-02-17 VITALS — DIASTOLIC BLOOD PRESSURE: 77 MMHG | SYSTOLIC BLOOD PRESSURE: 139 MMHG

## 2020-02-17 VITALS — SYSTOLIC BLOOD PRESSURE: 147 MMHG | DIASTOLIC BLOOD PRESSURE: 84 MMHG

## 2020-02-17 LAB
ALBUMIN SERPL-MCNC: 1.8 G/DL (ref 3.5–5)
ALBUMIN/GLOB SERPL: 0.6 {RATIO} (ref 0.8–2)
ALP SERPL-CCNC: 62 IU/L (ref 40–150)
ALT SERPL-CCNC: 8 IU/L (ref 0–55)
ANION GAP SERPL CALC-SCNC: 10 MMOL/L (ref 8–16)
BASOPHILS # BLD AUTO: 0.1 10*3/UL (ref 0–0.1)
BASOPHILS NFR BLD AUTO: 0.5 % (ref 0–1)
BUN SERPL-MCNC: 14 MG/DL (ref 7–26)
BUN/CREAT SERPL: 24 (ref 6–25)
CALCIUM SERPL-MCNC: 7.6 MG/DL (ref 8.4–10.2)
CHLORIDE SERPL-SCNC: 101 MMOL/L (ref 98–107)
CO2 SERPL-SCNC: 26 MMOL/L (ref 22–29)
DEPRECATED NEUTROPHILS # BLD AUTO: 9.2 10*3/UL (ref 2.1–6.9)
EGFRCR SERPLBLD CKD-EPI 2021: > 60 ML/MIN (ref 60–?)
EOSINOPHIL # BLD AUTO: 0.3 10*3/UL (ref 0–0.4)
EOSINOPHIL NFR BLD AUTO: 2.7 % (ref 0–6)
ERYTHROCYTE [DISTWIDTH] IN CORD BLOOD: 15.3 % (ref 11.7–14.4)
GLOBULIN PLAS-MCNC: 3.2 G/DL (ref 2.3–3.5)
GLUCOSE SERPLBLD-MCNC: 115 MG/DL (ref 74–118)
HCT VFR BLD AUTO: 26 % (ref 38.2–49.6)
HGB BLD-MCNC: 8.7 G/DL (ref 14–18)
LYMPHOCYTES # BLD: 1.3 10*3/UL (ref 1–3.2)
LYMPHOCYTES NFR BLD AUTO: 10.3 % (ref 18–39.1)
MAGNESIUM SERPL-MCNC: 1.9 MG/DL (ref 1.3–2.1)
MCH RBC QN AUTO: 31.5 PG (ref 28–32)
MCHC RBC AUTO-ENTMCNC: 33.5 G/DL (ref 31–35)
MCV RBC AUTO: 94.2 FL (ref 81–99)
MONOCYTES # BLD AUTO: 1.2 10*3/UL (ref 0.2–0.8)
MONOCYTES NFR BLD AUTO: 10.1 % (ref 4.4–11.3)
NEUTS SEG NFR BLD AUTO: 75.9 % (ref 38.7–80)
PLATELET # BLD AUTO: 262 X10E3/UL (ref 140–360)
POTASSIUM SERPL-SCNC: 4 MMOL/L (ref 3.5–5.1)
RBC # BLD AUTO: 2.76 X10E6/UL (ref 4.3–5.7)
SODIUM SERPL-SCNC: 133 MMOL/L (ref 136–145)
TRIGL SERPL-MCNC: 62 MG/DL (ref 0–149)

## 2020-02-17 RX ADMIN — METRONIDAZOLE SCH MLS/HR: 500 INJECTION, SOLUTION INTRAVENOUS at 14:00

## 2020-02-17 RX ADMIN — SODIUM CHLORIDE SCH MLS/HR: 9 INJECTION, SOLUTION INTRAVENOUS at 22:32

## 2020-02-17 RX ADMIN — METRONIDAZOLE SCH MLS/HR: 500 INJECTION, SOLUTION INTRAVENOUS at 05:33

## 2020-02-17 RX ADMIN — Medication PRN MG: at 07:39

## 2020-02-17 RX ADMIN — METOPROLOL TARTRATE SCH MG: 25 TABLET, FILM COATED ORAL at 00:34

## 2020-02-17 RX ADMIN — METOPROLOL TARTRATE SCH MG: 25 TABLET, FILM COATED ORAL at 17:18

## 2020-02-17 RX ADMIN — Medication PRN MG: at 19:34

## 2020-02-17 RX ADMIN — AZTREONAM SCH MLS/HR: 1 INJECTION, SOLUTION INTRAVENOUS at 00:36

## 2020-02-17 RX ADMIN — FAMOTIDINE SCH MG: 10 INJECTION, SOLUTION INTRAVENOUS at 16:13

## 2020-02-17 RX ADMIN — METRONIDAZOLE SCH MLS/HR: 500 INJECTION, SOLUTION INTRAVENOUS at 22:32

## 2020-02-17 RX ADMIN — SODIUM CHLORIDE PRN MG: 900 INJECTION INTRAVENOUS at 19:34

## 2020-02-17 RX ADMIN — SODIUM CHLORIDE SCH MLS/HR: 9 INJECTION, SOLUTION INTRAVENOUS at 00:33

## 2020-02-17 RX ADMIN — VANCOMYCIN HYDROCHLORIDE SCH MLS/HR: 1 INJECTION, SOLUTION INTRAVENOUS at 17:17

## 2020-02-17 RX ADMIN — AZTREONAM SCH MLS/HR: 1 INJECTION, SOLUTION INTRAVENOUS at 08:13

## 2020-02-17 RX ADMIN — METOPROLOL TARTRATE SCH MG: 25 TABLET, FILM COATED ORAL at 11:08

## 2020-02-17 RX ADMIN — METOPROLOL TARTRATE SCH MG: 25 TABLET, FILM COATED ORAL at 05:33

## 2020-02-17 RX ADMIN — FAMOTIDINE SCH MG: 10 INJECTION, SOLUTION INTRAVENOUS at 08:13

## 2020-02-17 RX ADMIN — AZTREONAM SCH MLS/HR: 1 INJECTION, SOLUTION INTRAVENOUS at 16:13

## 2020-02-17 NOTE — NUR
Received patient from ICU. Respiration even and unlabored without SOB. NG tube to left nare 
attached to low intermittent suction. Caude indwelling urinary catheter intact, in placed 
with small amount of yellow-colored urine to bag. Family at bedside. Call light in reach.

## 2020-02-17 NOTE — NUR
PATIENT RESTING IN BED IN STABLE CONDITION, NO SIGNS OF DISTRESS NOTED. NG TUBE IS INTACT 
AND RUNNING AT LOW INTERMITTENT SUCTION, PATIENT VOICES PAIN AT A LEVEL OF 7 AND MEDICATED 
AS ORDERED. TPN AND FLUIDS ARE RUNNING AT ORDERED RATE, BED IS IN LOWEST POSITION, BOTH SIDE 
RAILS ARE UP, CALL LIGHT IS WITHIN EASY REACH, WILL CONTINUE TO MONITOR.

## 2020-02-17 NOTE — PROGRESS NOTE
DATE:    

 

SUBJECTIVE:  The patient required some additional medication for pain.  He continues to

receive TPN.  His vital signs are stable. 

 

PHYSICAL EXAMINATION:

VITAL SIGNS:  The blood pressure is 139/77, the pulse is 90, and saturation is 98%. 

CARDIAC:  Reveals regular rate and rhythm with normal S1 and S2. 

LUNGS:  Auscultation of lungs reveals decreased breath sounds at the bases.  There is no

wheezing. 

ABDOMEN:  Soft and nontender.  There is no rebound or guarding.

LABORATORY DATA:  White blood cell count is 12 and hemoglobin is 8.7.  The platelet

count is 262.  BUN to creatinine ratio is normal.  The sodium is 133.  Blood sugars 125. 

 

IMPRESSION:  

1. Small bowel obstruction requiring intestinal bypass.

2. Metastatic adenocarcinoma of the prostrate.

3. Moderate protein-calorie malnutrition.

4. hyponatremia.

5. Hypertension.

 

PLAN:  

1. Continue TPN.

2. Continue pain control.

3. Out of bed as tolerated.

4. Transfer out of ICU to the [facility].

 

 

 

 

______________________________

Erick Humphrey MD

 

[facility]

D:  02/17/2020 15:55:57

T:  02/17/2020 18:21:41

Job #:  [id number]

## 2020-02-17 NOTE — NUR
IM- progress note

O/N no evens

ROS: no f/c/s/HA/cp/confusion/focal limb weakness/dizziness/vision changes



v/s revd

PE

tired appearing

facial asymmetry

ns1s2

mod bs

soft; mild tender in mid abdomen

no e/t

skin dry

flat affect

a&ox3; moe



labs/med revd



A/P: 74yoM

SBO

Pancreastic adenocarcinoma

Diverticulosis

Cholelithiasis

Right lung nodule

GERD

HTN

FOrmer smoker



PLAN

Place NGT; IVF; NPO; antiemetics; NGT; Sx consult; GI eval; 

IV ppi and SCD; 



2/10 ambulate; sx eval; check lytes

2/11 check labs

2/12 cont care; check labs; f/u sx plan; Ambulate; optimize lytes

2/13 s/p surgery; check labs

2/14 check labs; started on TPN

2/15 use NS for Hyponatremia; cont care 

2/16 Sepsis- got vanco; fevers resolving; Na improving; cont IVF and broad spec 
abx; vanco trough pending cct>35mins

2/17 Afebrile overnight; leukocytosis improving; cont abx; monitor closely; 

Lane Hardin MD, PhD.

## 2020-02-18 VITALS — SYSTOLIC BLOOD PRESSURE: 141 MMHG | DIASTOLIC BLOOD PRESSURE: 88 MMHG

## 2020-02-18 VITALS — DIASTOLIC BLOOD PRESSURE: 109 MMHG | SYSTOLIC BLOOD PRESSURE: 172 MMHG

## 2020-02-18 VITALS — DIASTOLIC BLOOD PRESSURE: 84 MMHG | SYSTOLIC BLOOD PRESSURE: 157 MMHG

## 2020-02-18 VITALS — SYSTOLIC BLOOD PRESSURE: 149 MMHG | DIASTOLIC BLOOD PRESSURE: 91 MMHG

## 2020-02-18 VITALS — SYSTOLIC BLOOD PRESSURE: 164 MMHG | DIASTOLIC BLOOD PRESSURE: 86 MMHG

## 2020-02-18 VITALS — SYSTOLIC BLOOD PRESSURE: 161 MMHG | DIASTOLIC BLOOD PRESSURE: 90 MMHG

## 2020-02-18 LAB
ANION GAP SERPL CALC-SCNC: 10.4 MMOL/L (ref 8–16)
BASOPHILS # BLD AUTO: 0.1 10*3/UL (ref 0–0.1)
BASOPHILS NFR BLD AUTO: 0.5 % (ref 0–1)
BUN SERPL-MCNC: 13 MG/DL (ref 7–26)
BUN/CREAT SERPL: 21 (ref 6–25)
CALCIUM SERPL-MCNC: 7.8 MG/DL (ref 8.4–10.2)
CHLORIDE SERPL-SCNC: 101 MMOL/L (ref 98–107)
CO2 SERPL-SCNC: 25 MMOL/L (ref 22–29)
DEPRECATED NEUTROPHILS # BLD AUTO: 7.4 10*3/UL (ref 2.1–6.9)
EGFRCR SERPLBLD CKD-EPI 2021: > 60 ML/MIN (ref 60–?)
EOSINOPHIL # BLD AUTO: 0.3 10*3/UL (ref 0–0.4)
EOSINOPHIL NFR BLD AUTO: 2.6 % (ref 0–6)
ERYTHROCYTE [DISTWIDTH] IN CORD BLOOD: 15.3 % (ref 11.7–14.4)
GLUCOSE SERPLBLD-MCNC: 115 MG/DL (ref 74–118)
HCT VFR BLD AUTO: 26.4 % (ref 38.2–49.6)
HGB BLD-MCNC: 8.8 G/DL (ref 14–18)
LYMPHOCYTES # BLD: 1.3 10*3/UL (ref 1–3.2)
LYMPHOCYTES NFR BLD AUTO: 13 % (ref 18–39.1)
MCH RBC QN AUTO: 31.3 PG (ref 28–32)
MCHC RBC AUTO-ENTMCNC: 33.3 G/DL (ref 31–35)
MCV RBC AUTO: 94 FL (ref 81–99)
MONOCYTES # BLD AUTO: 1.1 10*3/UL (ref 0.2–0.8)
MONOCYTES NFR BLD AUTO: 10.7 % (ref 4.4–11.3)
NEUTS SEG NFR BLD AUTO: 72.8 % (ref 38.7–80)
PLATELET # BLD AUTO: 258 X10E3/UL (ref 140–360)
POTASSIUM SERPL-SCNC: 4.4 MMOL/L (ref 3.5–5.1)
RBC # BLD AUTO: 2.81 X10E6/UL (ref 4.3–5.7)
SODIUM SERPL-SCNC: 132 MMOL/L (ref 136–145)

## 2020-02-18 RX ADMIN — METOPROLOL TARTRATE SCH MG: 25 TABLET, FILM COATED ORAL at 04:52

## 2020-02-18 RX ADMIN — METOPROLOL TARTRATE SCH MG: 25 TABLET, FILM COATED ORAL at 11:47

## 2020-02-18 RX ADMIN — METOPROLOL TARTRATE PRN MG: 1 INJECTION, SOLUTION INTRAVENOUS at 17:41

## 2020-02-18 RX ADMIN — METRONIDAZOLE SCH MLS/HR: 500 INJECTION, SOLUTION INTRAVENOUS at 05:20

## 2020-02-18 RX ADMIN — SODIUM CHLORIDE SCH MLS/HR: 9 INJECTION, SOLUTION INTRAVENOUS at 16:12

## 2020-02-18 RX ADMIN — AZTREONAM SCH MLS/HR: 1 INJECTION, SOLUTION INTRAVENOUS at 00:45

## 2020-02-18 RX ADMIN — AZTREONAM SCH MLS/HR: 1 INJECTION, SOLUTION INTRAVENOUS at 16:12

## 2020-02-18 RX ADMIN — METOPROLOL TARTRATE PRN MG: 1 INJECTION, SOLUTION INTRAVENOUS at 05:20

## 2020-02-18 RX ADMIN — METOPROLOL TARTRATE SCH MG: 25 TABLET, FILM COATED ORAL at 00:00

## 2020-02-18 RX ADMIN — PROMETHAZINE HYDROCHLORIDE PRN MG: 25 INJECTION, SOLUTION INTRAMUSCULAR; INTRAVENOUS at 20:53

## 2020-02-18 RX ADMIN — AZTREONAM SCH MLS/HR: 1 INJECTION, SOLUTION INTRAVENOUS at 08:44

## 2020-02-18 RX ADMIN — VANCOMYCIN HYDROCHLORIDE SCH MLS/HR: 1 INJECTION, SOLUTION INTRAVENOUS at 17:40

## 2020-02-18 RX ADMIN — SODIUM CHLORIDE PRN MG: 900 INJECTION INTRAVENOUS at 19:41

## 2020-02-18 RX ADMIN — METRONIDAZOLE SCH MLS/HR: 500 INJECTION, SOLUTION INTRAVENOUS at 22:38

## 2020-02-18 RX ADMIN — FAMOTIDINE SCH MG: 10 INJECTION, SOLUTION INTRAVENOUS at 08:44

## 2020-02-18 RX ADMIN — Medication PRN MG: at 19:41

## 2020-02-18 RX ADMIN — METOPROLOL TARTRATE PRN MG: 1 INJECTION, SOLUTION INTRAVENOUS at 11:47

## 2020-02-18 RX ADMIN — METOPROLOL TARTRATE SCH MG: 25 TABLET, FILM COATED ORAL at 17:40

## 2020-02-18 RX ADMIN — FAMOTIDINE SCH MG: 10 INJECTION, SOLUTION INTRAVENOUS at 16:12

## 2020-02-18 RX ADMIN — METRONIDAZOLE SCH MLS/HR: 500 INJECTION, SOLUTION INTRAVENOUS at 13:55

## 2020-02-18 NOTE — NUR
IM- progress note

O/N no evens

ROS: no f/c/s/HA/cp/confusion/focal limb weakness/dizziness/vision changes



v/s revd

PE

tired appearing

facial asymmetry

ns1s2

mod bs

soft; mild tender in mid abdomen

no e/t

skin dry

flat affect

a&ox3; moe



labs/med revd



A/P: 74yoM

SBO

Pancreastic adenocarcinoma

Diverticulosis

Cholelithiasis

Right lung nodule

GERD

HTN

FOrmer smoker



PLAN

Place NGT; IVF; NPO; antiemetics; NGT; Sx consult; GI eval; 

IV ppi and SCD; 



2/10 ambulate; sx eval; check lytes

2/11 check labs

2/12 cont care; check labs; f/u sx plan; Ambulate; optimize lytes

2/13 s/p surgery; check labs

2/14 check labs; started on TPN

2/15 use NS for Hyponatremia; cont care 

2/16 Sepsis- got vanco; fevers resolving; Na improving; cont IVF and broad spec 
abx; vanco trough pending cct>35mins

2/17 Afebrile overnight; leukocytosis improving; cont abx; monitor closely; 

2/18 check labs; 

Lane Hardin MD, PhD.

## 2020-02-18 NOTE — NUR
patient slipped out of bed and fell to floor. states no injury. Dr Hardin notified. patient 
refusing further medical workup.

## 2020-02-18 NOTE — DIAGNOSTIC IMAGING REPORT
Abdomen/KUB



INDICATION: Status post abdominal surgery 

^sbo

^[id number]

^[id number]



COMPARISON: Abdomen x-ray 2/16/2020.



FINDINGS: 

Portable, supine image obtained at 2239 hours.



Medical Devices: Enteric tube terminates in the distal stomach. Midline

abdominal wall staples have been applied.



Bowel: Unremarkable bowel gas pattern. A few air distended small bowel loops

measure up to 3 cm. No pneumatosis. No pneumoperitoneum. No large bowel

dilatation.



Free air:  None



Abdominal calcifications: None over the renal shadows or along the expected

course of the ureters



Organomegaly: None



Lung bases: Left basilar atelectasis. Small bilateral pleural effusions



Bones: Degenerative changes of the spine



IMPRESSION:



1. Enteric tube as described above.

2. Mild small bowel ileus.

3. Small bilateral pleural effusions and left basilar atelectasis.



Signed by: Dr. Nahun Das MD on 2/18/2020 11:04 PM

## 2020-02-18 NOTE — NUR
PATIENT HAS RECEIVED PROMETHAZINE FOR NAUSEA AND VOICED THAT HE FELT BETTER. ABDOMINAL 
DRESSING WAS CHANGED AND IS NOW CLEAN DRY AND INTACT. PATIENT NOW RUNNING FEVER, CALLED DR. JONES FOR MEDICATION ORDER AWAITING FOR PHARMACY TO CLEAR IT.

## 2020-02-18 NOTE — NUR
PATIENT RESTING IN BED, COMPLAINS OF NAUSEA AND WAS PREVIOUSLY MEDICATED WITH ZOFRAN AND HAS 
NOT VOMITED ANYTHING. NASAL CANNULA INTACT AND RUNNING AT LOW INTERMITTENT SUCTION AND 
PATIENT VOICED PAIN AT A LEVEL OF 4 AND WAS PREVIOUSLY MEDICATED FOR THAT. BED IS IN LOWEST 
POSITION, BOTH SIDE RAILS ARE UP, BED ALARM IS ON, CALL LIGHT WITHIN REACH WILL CONTINUE TO 
MONITOR.

-------------------------------------------------------------------------------

Addendum: 02/19/20 at 0054 by Murali Stevenson RN

-------------------------------------------------------------------------------

NG TUBE NOT NASAL CANNULA

## 2020-02-18 NOTE — NUR
Nutrition Intervention Note



RD Recommendation(s) for Physician: 

- TPN Rec's: Increase NaCl to 80 mEq/L, continue other components as ordered.

- Please check Phos with next lab draw.

- When feasible, ADAT to GI Soft. Recommend Ensure Clear TID when diet advanced. 



Nutrition reason for involvement: follow up, TPN



RD Assessment

2/18: Follow up. Pt sleeping at time of visit, no family present. Pt continues on TPN at 75 
ml/hr and NGT to LIWS with very little output. Pt discussed during am rounds, possible D/C 
of NGT today per RN. No GI distress reported and no BM reported. Chart reviewed. TPN and 
diet rec's provided. Will continue to monitor. 



2/14: Follow up. Pt continues to have NGT in place and was started on TPN last night. TPN 
recommendation was provided to RN. Will continue to monitor.

 

2/12: 74 YOM admitted for SBO with PMH listed below. The pt was seen resting in bed, NGT in 
place with daughter at bedside. Spoke about pt in rounds, per nurse he is going for surgery 
today (intestinal bypass of point of obstruction) and MD may initiate TPN after surgery but 
was still deciding, please consult RD if decision is made to start TPN. Pt reported he has 
been eating well at home despite losing weight d/t his chemo. He reported he lost 10-15 lbs 
in the past 2-3 months.  Pt was here in Oct 2019, pt was 170 lbs at that suggesting a 3% 
weight loss within 3 months which is not significant enough to meet ASPEN malnutrition 
criteria. He denied chewing or swallowing issues as well as any food allergies. Will 
continue to monitor. 



Principal Problems/Diagnoses: SBO



PMH: Significant for inoperable advanced pancreatic carcinoma, hypertension, coronary artery 
disease.



GI: Abd: soft, tender LBM: 2/11

+ NGT to LIWS- 10 ml output q am shift 



Skin: no pressure ulcer recorded 



Labs:  2/18: Na 132, K 4.4, Cl 101, CO2 25, BUN 13, Cr 0.61, Gluc 116, Mg 1.8, POC Gluc 
123-125, no Phos

2/14: Na 130, BUN 6, Creat 0.58, Glu 130, Ca 7.6

2/12:Na 136, K 3.9, Cl 102, CO2 26, BUN 19, Creat 0.73, Gluc 104, Ca 8.1, Phos 3.1, Mg 1.9, 
Tbili 0.7 



Meds: pepcid, abx, zofran, morphine



Ht:65 in (not 60 inches)          

Wt: 165 lbs      

BMI: 27.5 kg/m^2          

IBW: 136lbs 



Malnutrition Evaluation (2/12/20) 

The patient does not meet criteria for a specified degree of malnutrition at this time. Will 
re-evaluate at follow-up as appropriate. 



Energy intake:-pt denies poor oral intake

Weight loss:. He reported he lost 10-15 lbs in the past 2-3 months.  Pt was here in Oct 
2018, pt was 170 lbs at that suggesting a 3% weight loss within 3 months which is not 
significant enough to meet ASPEN malnutrition criteria

Fat loss: Mild- dark eyes 

Muscle loss: Mild-temporal wasting  



Fluid accumulation: No cyanosis or edema.

Functional Status: unable to evaluate





Nutrition Prescription (Diet Order): Standard TPN at 75 mL/hr and 25 g/day lipids (total 
volume 1200 mL, Dextrose 30% 500 mL (270 g/day), AA 10% 500 mL/ (90 g/day), 25 gm/day lipids 
with standard electrolytes, trace elements, and multivitamins, thiamine, folic acid, 
famotidine, and insulin 10 units/day.

-provides 1528 kcal and 90 g protein



Estimated Nutritional Needs:

Calories: 8509-4375 kcal/day (18-22  kcal/kg/day) Weight used : 75 kg CBW

Protein :  g protein/day (1-1.5 gram/kg/day ) Weight used: 75 kg CBW



Diet Adequacy: meeting calorie needs, meeting protein needs



Diet Education Needs Assessment: Diet education not indicated, patient on 
temporary/transition diet.



Nutrition Care Level: high 



Nutrition Diagnosis: Altered GI function related to SBO as evidenced by need for TPN



Goal: Patient will meet % of estimated needs by follow up 



Progress: progressing



Interventions:

- TPN - Rate, Route, Collaboration with other providers



Monitoring/Evaluation: 

Total energy intake, Total protein intake, Formula/Solution, Weight change



Signed: Maddie Cosme RD, LD, [facility]

## 2020-02-19 VITALS — SYSTOLIC BLOOD PRESSURE: 149 MMHG | DIASTOLIC BLOOD PRESSURE: 80 MMHG

## 2020-02-19 VITALS — DIASTOLIC BLOOD PRESSURE: 80 MMHG | SYSTOLIC BLOOD PRESSURE: 146 MMHG

## 2020-02-19 VITALS — DIASTOLIC BLOOD PRESSURE: 73 MMHG | SYSTOLIC BLOOD PRESSURE: 138 MMHG

## 2020-02-19 VITALS — DIASTOLIC BLOOD PRESSURE: 80 MMHG | SYSTOLIC BLOOD PRESSURE: 149 MMHG

## 2020-02-19 VITALS — SYSTOLIC BLOOD PRESSURE: 153 MMHG | DIASTOLIC BLOOD PRESSURE: 87 MMHG

## 2020-02-19 VITALS — DIASTOLIC BLOOD PRESSURE: 87 MMHG | SYSTOLIC BLOOD PRESSURE: 153 MMHG

## 2020-02-19 VITALS — DIASTOLIC BLOOD PRESSURE: 76 MMHG | SYSTOLIC BLOOD PRESSURE: 140 MMHG

## 2020-02-19 VITALS — DIASTOLIC BLOOD PRESSURE: 72 MMHG | SYSTOLIC BLOOD PRESSURE: 130 MMHG

## 2020-02-19 RX ADMIN — METOPROLOL TARTRATE SCH MG: 25 TABLET, FILM COATED ORAL at 17:12

## 2020-02-19 RX ADMIN — METOPROLOL TARTRATE PRN MG: 1 INJECTION, SOLUTION INTRAVENOUS at 13:15

## 2020-02-19 RX ADMIN — VANCOMYCIN HYDROCHLORIDE SCH MLS/HR: 1 INJECTION, SOLUTION INTRAVENOUS at 18:17

## 2020-02-19 RX ADMIN — AZTREONAM SCH MLS/HR: 1 INJECTION, SOLUTION INTRAVENOUS at 17:30

## 2020-02-19 RX ADMIN — FAMOTIDINE SCH MG: 10 INJECTION, SOLUTION INTRAVENOUS at 09:00

## 2020-02-19 RX ADMIN — FAMOTIDINE SCH MG: 10 INJECTION, SOLUTION INTRAVENOUS at 21:21

## 2020-02-19 RX ADMIN — AZTREONAM SCH MLS/HR: 1 INJECTION, SOLUTION INTRAVENOUS at 09:00

## 2020-02-19 RX ADMIN — GUAIFENESIN AND DEXTROMETHORPHAN PRN ML: 100; 10 SYRUP ORAL at 21:38

## 2020-02-19 RX ADMIN — METRONIDAZOLE SCH MLS/HR: 500 INJECTION, SOLUTION INTRAVENOUS at 14:10

## 2020-02-19 RX ADMIN — SODIUM CHLORIDE SCH MLS/HR: 9 INJECTION, SOLUTION INTRAVENOUS at 12:15

## 2020-02-19 RX ADMIN — METOPROLOL TARTRATE SCH MG: 25 TABLET, FILM COATED ORAL at 12:00

## 2020-02-19 RX ADMIN — METRONIDAZOLE SCH MLS/HR: 500 INJECTION, SOLUTION INTRAVENOUS at 05:22

## 2020-02-19 RX ADMIN — AZTREONAM SCH MLS/HR: 1 INJECTION, SOLUTION INTRAVENOUS at 01:08

## 2020-02-19 RX ADMIN — METRONIDAZOLE SCH MLS/HR: 500 INJECTION, SOLUTION INTRAVENOUS at 21:21

## 2020-02-19 RX ADMIN — METOPROLOL TARTRATE SCH MG: 25 TABLET, FILM COATED ORAL at 05:04

## 2020-02-19 RX ADMIN — METOPROLOL TARTRATE SCH MG: 25 TABLET, FILM COATED ORAL at 00:00

## 2020-02-19 NOTE — NUR
PATIENT CONTINUOUSLY COUGHING AND WANTS SOMETHING FOR HIS COUGH. CALLED AND TALKED TO DR. JONES AND HE ORDERED ROBITUSSIN DM Q8H

## 2020-02-19 NOTE — NUR
RECEIVED BEDSIDE SHIFT REPORT FROM PREVIOUS NURSE. CALL LIGHT WITHIN REACH. PATIENT IN BED. 
NG TUBE FLOWING WELL

## 2020-02-19 NOTE — NUR
PATIENT ATTEMPTED TO GET OUT OF BED AGAIN AND WAS SITTING ON EDGE OF BED, NG TUBE AND COUDE 
CATHETER IN PLACE. PATIENT WAS PLACED SAFELY BACK IN BED AND REASSESSED, ORIENTED TO PERSON, 
PLACE. CONTINUING TO MONITOR.

## 2020-02-19 NOTE — NUR
PT COMPLAIN OF STOMACH "UPSET AFTER DRINKING LIQUIDS", NGT RECONNECTED TO SUCTION, PT STATES 
'FEELS BETTER", REPOSITIONED, PCT CLEANSED PT , CALL LIGHT WITHIN REACH

## 2020-02-19 NOTE — NUR
PATIENT ATTEMPTED TO GET OUT OF BED, VOICED THAT HE HAD TO URINATE, BUT PATIENT HAS COUDE 
CATHETER IN PLACE. PATIENT WAS PLACED SAFELY BACK IN BED AND REASSESSED, ORIENTED TO PERSON, 
PLACE, AND TIME. CONTINUING TO MONITOR.

## 2020-02-19 NOTE — NUR
IM- progress note

O/N no evens

ROS: no f/c/s/HA/cp/confusion/focal limb weakness/dizziness/vision changes



v/s revd

PE

tired appearing

facial asymmetry

ns1s2

mod bs

soft; mild tender in mid abdomen

no e/t

skin dry

flat affect

a&ox3; moe



labs/med revd



A/P: 74yoM

SBO

Pancreastic adenocarcinoma

Diverticulosis

Cholelithiasis

Right lung nodule

GERD

HTN

FOrmer smoker



PLAN

Place NGT; IVF; NPO; antiemetics; NGT; Sx consult; GI eval; 

IV ppi and SCD; 



2/10 ambulate; sx eval; check lytes

2/11 check labs

2/12 cont care; check labs; f/u sx plan; Ambulate; optimize lytes

2/13 s/p surgery; check labs

2/14 check labs; started on TPN

2/15 use NS for Hyponatremia; cont care 

2/16 Sepsis- got vanco; fevers resolving; Na improving; cont IVF and broad spec 
abx; vanco trough pending cct>35mins

2/17 Afebrile overnight; leukocytosis improving; cont abx; monitor closely; 

2/18 check labs; 

2/19 ambulate pt; diet being advanced.

Lane Hardin MD, PhD.

## 2020-02-20 VITALS — DIASTOLIC BLOOD PRESSURE: 81 MMHG | SYSTOLIC BLOOD PRESSURE: 162 MMHG

## 2020-02-20 VITALS — DIASTOLIC BLOOD PRESSURE: 92 MMHG | SYSTOLIC BLOOD PRESSURE: 158 MMHG

## 2020-02-20 VITALS — DIASTOLIC BLOOD PRESSURE: 87 MMHG | SYSTOLIC BLOOD PRESSURE: 156 MMHG

## 2020-02-20 VITALS — SYSTOLIC BLOOD PRESSURE: 151 MMHG | DIASTOLIC BLOOD PRESSURE: 77 MMHG

## 2020-02-20 VITALS — SYSTOLIC BLOOD PRESSURE: 148 MMHG | DIASTOLIC BLOOD PRESSURE: 82 MMHG

## 2020-02-20 VITALS — SYSTOLIC BLOOD PRESSURE: 162 MMHG | DIASTOLIC BLOOD PRESSURE: 81 MMHG

## 2020-02-20 LAB
ANION GAP SERPL CALC-SCNC: 8.9 MMOL/L (ref 8–16)
BASOPHILS # BLD AUTO: 0.1 10*3/UL (ref 0–0.1)
BASOPHILS NFR BLD AUTO: 0.5 % (ref 0–1)
BUN SERPL-MCNC: 11 MG/DL (ref 7–26)
BUN/CREAT SERPL: 20 (ref 6–25)
CALCIUM SERPL-MCNC: 7.7 MG/DL (ref 8.4–10.2)
CHLORIDE SERPL-SCNC: 102 MMOL/L (ref 98–107)
CO2 SERPL-SCNC: 24 MMOL/L (ref 22–29)
DEPRECATED NEUTROPHILS # BLD AUTO: 6.7 10*3/UL (ref 2.1–6.9)
DEPRECATED PHOSPHATE SERPL-MCNC: 3.4 MG/DL (ref 2.3–4.7)
EGFRCR SERPLBLD CKD-EPI 2021: > 60 ML/MIN (ref 60–?)
EOSINOPHIL # BLD AUTO: 0.2 10*3/UL (ref 0–0.4)
EOSINOPHIL NFR BLD AUTO: 1.9 % (ref 0–6)
ERYTHROCYTE [DISTWIDTH] IN CORD BLOOD: 15.4 % (ref 11.7–14.4)
GLUCOSE SERPLBLD-MCNC: 132 MG/DL (ref 74–118)
HCT VFR BLD AUTO: 26.3 % (ref 38.2–49.6)
HGB BLD-MCNC: 8.6 G/DL (ref 14–18)
LYMPHOCYTES # BLD: 1.6 10*3/UL (ref 1–3.2)
LYMPHOCYTES NFR BLD AUTO: 16.4 % (ref 18–39.1)
LYMPHOCYTES NFR BLD MANUAL: 14 % (ref 19–48)
MAGNESIUM SERPL-MCNC: 1.8 MG/DL (ref 1.3–2.1)
MCH RBC QN AUTO: 30.2 PG (ref 28–32)
MCHC RBC AUTO-ENTMCNC: 32.7 G/DL (ref 31–35)
MCV RBC AUTO: 92.3 FL (ref 81–99)
MONOCYTES # BLD AUTO: 1.1 10*3/UL (ref 0.2–0.8)
MONOCYTES NFR BLD AUTO: 11.2 % (ref 4.4–11.3)
MONOCYTES NFR BLD MANUAL: 9 % (ref 3.4–9)
NEUTS SEG NFR BLD AUTO: 69.6 % (ref 38.7–80)
NEUTS SEG NFR BLD MANUAL: 77 % (ref 40–74)
PLATELET # BLD AUTO: 275 X10E3/UL (ref 140–360)
POTASSIUM SERPL-SCNC: 3.9 MMOL/L (ref 3.5–5.1)
RBC # BLD AUTO: 2.85 X10E6/UL (ref 4.3–5.7)
SODIUM SERPL-SCNC: 131 MMOL/L (ref 136–145)

## 2020-02-20 RX ADMIN — GUAIFENESIN AND DEXTROMETHORPHAN PRN ML: 100; 10 SYRUP ORAL at 10:20

## 2020-02-20 RX ADMIN — METOPROLOL TARTRATE SCH MG: 25 TABLET, FILM COATED ORAL at 00:42

## 2020-02-20 RX ADMIN — AZTREONAM SCH MLS/HR: 1 INJECTION, SOLUTION INTRAVENOUS at 00:42

## 2020-02-20 RX ADMIN — FAMOTIDINE SCH MG: 10 INJECTION, SOLUTION INTRAVENOUS at 20:05

## 2020-02-20 RX ADMIN — METOPROLOL TARTRATE SCH MG: 25 TABLET, FILM COATED ORAL at 05:44

## 2020-02-20 RX ADMIN — VANCOMYCIN HYDROCHLORIDE SCH MLS/HR: 1 INJECTION, SOLUTION INTRAVENOUS at 17:54

## 2020-02-20 RX ADMIN — METOPROLOL TARTRATE SCH MG: 25 TABLET, FILM COATED ORAL at 00:00

## 2020-02-20 RX ADMIN — AZTREONAM SCH MLS/HR: 1 INJECTION, SOLUTION INTRAVENOUS at 10:00

## 2020-02-20 RX ADMIN — GUAIFENESIN AND DEXTROMETHORPHAN PRN ML: 100; 10 SYRUP ORAL at 20:05

## 2020-02-20 RX ADMIN — METRONIDAZOLE SCH MLS/HR: 500 INJECTION, SOLUTION INTRAVENOUS at 05:42

## 2020-02-20 RX ADMIN — METOPROLOL TARTRATE SCH MG: 25 TABLET, FILM COATED ORAL at 18:00

## 2020-02-20 RX ADMIN — AZTREONAM SCH MLS/HR: 1 INJECTION, SOLUTION INTRAVENOUS at 17:00

## 2020-02-20 RX ADMIN — METOPROLOL TARTRATE SCH MG: 25 TABLET, FILM COATED ORAL at 12:00

## 2020-02-20 RX ADMIN — SODIUM CHLORIDE SCH MLS/HR: 9 INJECTION, SOLUTION INTRAVENOUS at 08:15

## 2020-02-20 RX ADMIN — METOPROLOL TARTRATE SCH MG: 25 TABLET, FILM COATED ORAL at 00:46

## 2020-02-20 RX ADMIN — METRONIDAZOLE SCH MLS/HR: 500 INJECTION, SOLUTION INTRAVENOUS at 14:00

## 2020-02-20 RX ADMIN — SODIUM CHLORIDE PRN MG: 900 INJECTION INTRAVENOUS at 20:05

## 2020-02-20 RX ADMIN — FAMOTIDINE SCH MG: 10 INJECTION, SOLUTION INTRAVENOUS at 10:00

## 2020-02-20 NOTE — NUR
GAVE BEDSIDE SHIFT REPORT FROM PREVIOUS NURSE. CALL LIGHT WITHIN REACH. PATIENT IN BED. 
PATIENT IS A&OX3.

-------------------------------------------------------------------------------

Addendum: 02/20/20 at 0711 by Julissa March RN

-------------------------------------------------------------------------------

RUSH AND NG TUBE DRAINING WELL

## 2020-02-20 NOTE — DIAGNOSTIC IMAGING REPORT
Examination: Single AP view of the chest.



COMPARISON: Portable chest 2/16/2020



INDICATION: Shortness of breath

    

IMPRESSION:

 

1.  Lines and Tubes: Right-sided central line and enteric tube are unchanged.

2.  Mildly hypoinflated lungs. Slight worsening of right basilar atelectatic

changes. Left basilar atelectatic changes are stable. No consolidation.

3.  Cardiomediastinal silhouette is normal.  Central pulmonary vascular venous

congestion.

4.  No acute bony abnormalities.



Signed by: Dr. Darrius Logan M.D. on 2/20/2020 7:35 AM

## 2020-02-20 NOTE — NUR
IM- progress note

O/N no evens

ROS: no f/c/s/HA/cp/confusion/focal limb weakness/dizziness/vision changes



v/s revd

PE

tired appearing

facial asymmetry

ns1s2

mod bs

soft; mild tender in mid abdomen

no e/t

skin dry

flat affect

a&ox3; moe



labs/med revd



A/P: 74yoM

SBO

Pancreastic adenocarcinoma

Diverticulosis

Cholelithiasis

Right lung nodule

GERD

HTN

FOrmer smoker



PLAN

Place NGT; IVF; NPO; antiemetics; NGT; Sx consult; GI eval; 

IV ppi and SCD; 



2/10 ambulate; sx eval; check lytes

2/11 check labs

2/12 cont care; check labs; f/u sx plan; Ambulate; optimize lytes

2/13 s/p surgery; check labs

2/14 check labs; started on TPN

2/15 use NS for Hyponatremia; cont care 

2/16 Sepsis- got vanco; fevers resolving; Na improving; cont IVF and broad spec 
abx; vanco trough pending cct>35mins

2/17 Afebrile overnight; leukocytosis improving; cont abx; monitor closely; 

2/18 check labs; 

2/19 ambulate pt; diet being advanced.

2/20 check labs; cont CLD; ambulate

Lane Hardin MD, PhD.

## 2020-02-20 NOTE — NUR
RECEIVED BEDSIDE SHIFT REPORT FROM PREVIOUS NURSE. CALL LIGHT WITHIN REACH. PATIENT IN BED. 
RUSH AND NG TUBE DRAINING WELL

## 2020-02-21 VITALS — DIASTOLIC BLOOD PRESSURE: 87 MMHG | SYSTOLIC BLOOD PRESSURE: 167 MMHG

## 2020-02-21 VITALS — SYSTOLIC BLOOD PRESSURE: 156 MMHG | DIASTOLIC BLOOD PRESSURE: 97 MMHG

## 2020-02-21 VITALS — SYSTOLIC BLOOD PRESSURE: 160 MMHG | DIASTOLIC BLOOD PRESSURE: 91 MMHG

## 2020-02-21 VITALS — SYSTOLIC BLOOD PRESSURE: 158 MMHG | DIASTOLIC BLOOD PRESSURE: 101 MMHG

## 2020-02-21 VITALS — DIASTOLIC BLOOD PRESSURE: 90 MMHG | SYSTOLIC BLOOD PRESSURE: 146 MMHG

## 2020-02-21 VITALS — SYSTOLIC BLOOD PRESSURE: 146 MMHG | DIASTOLIC BLOOD PRESSURE: 90 MMHG

## 2020-02-21 VITALS — DIASTOLIC BLOOD PRESSURE: 93 MMHG | SYSTOLIC BLOOD PRESSURE: 159 MMHG

## 2020-02-21 RX ADMIN — METOPROLOL TARTRATE SCH MG: 25 TABLET, FILM COATED ORAL at 05:46

## 2020-02-21 RX ADMIN — AZTREONAM SCH MLS/HR: 1 INJECTION, SOLUTION INTRAVENOUS at 02:09

## 2020-02-21 RX ADMIN — METOPROLOL TARTRATE SCH MG: 25 TABLET, FILM COATED ORAL at 18:20

## 2020-02-21 RX ADMIN — FAMOTIDINE SCH MG: 10 INJECTION, SOLUTION INTRAVENOUS at 09:45

## 2020-02-21 RX ADMIN — FAMOTIDINE SCH MG: 10 INJECTION, SOLUTION INTRAVENOUS at 21:05

## 2020-02-21 RX ADMIN — AZTREONAM SCH MLS/HR: 1 INJECTION, SOLUTION INTRAVENOUS at 16:13

## 2020-02-21 RX ADMIN — AZTREONAM SCH MLS/HR: 1 INJECTION, SOLUTION INTRAVENOUS at 10:20

## 2020-02-21 RX ADMIN — VANCOMYCIN HYDROCHLORIDE SCH MLS/HR: 1 INJECTION, SOLUTION INTRAVENOUS at 18:00

## 2020-02-21 RX ADMIN — METOPROLOL TARTRATE SCH MG: 25 TABLET, FILM COATED ORAL at 11:37

## 2020-02-21 RX ADMIN — SODIUM CHLORIDE SCH MLS/HR: 9 INJECTION, SOLUTION INTRAVENOUS at 04:47

## 2020-02-21 RX ADMIN — METOPROLOL TARTRATE SCH MG: 25 TABLET, FILM COATED ORAL at 00:00

## 2020-02-21 NOTE — DIAGNOSTIC IMAGING REPORT
Abdomen, one view on 2 radiographs



Clinical indication: Ileus



Comparison: 2/18/2020, 2/16/2020



Impression:

Enteric tube is in unchanged position. There are mildly prominent loops of

small bowel which have decreased in size when compared to prior examination

suggesting improving ileus. A small right pleural effusion is present.

Bibasilar atelectasis is noted.



Signed by: Stephen E Dreyer, MD on 2/21/2020 9:14 AM

## 2020-02-21 NOTE — NUR
Received patient lying in bed with eyes open. Family member at bedside. Indwelling urinary 
catheter intact, in placed with small amount of yellow-colored urine to bag. Right nare NG 
tube to low-intermittent suction with about 100 ml dark brown-colored output. Call light in 
reach.

## 2020-02-21 NOTE — NUR
Nutrition Intervention Note



RD Recommendation(s) for Physician: 

- Continue Standard TPN at 75 mL/hr and 25 g/day lipids (total volume 1200 mL, Dextrose 30% 
500 mL (270 g/day), AA 10% 500 mL/ (90 g/day), 25 gm/day lipids with standard electrolytes, 
trace elements, and multivitamins, thiamine, folic acid, famotidine, and insulin 10 
units/day.



- When medically appropriate advance to to GI Soft. Recommend Ensure Clear TID when diet 
advanced. 



Nutrition reason for involvement: follow up, TPN



RD Assessment

2/21: Follow up. Pt was discussed during rounds. Pt still has NGT and is receiving TPN. Pt 
is tolerating clear liquids today per RN and TPN is going to be continued. Will continue to 
monitor.



2/18: Follow up. Pt sleeping at time of visit, no family present. Pt continues on TPN at 75 
ml/hr and NGT to LIWS with very little output. Pt discussed during am rounds, possible D/C 
of NGT today per RN. No GI distress reported and no BM reported. Chart reviewed. TPN and 
diet rec's provided. Will continue to monitor. 



2/14: Follow up. Pt continues to have NGT in place and was started on TPN last night. TPN 
recommendation was provided to RN. Will continue to monitor.

 

2/12: 74 YOM admitted for SBO with PMH listed below. The pt was seen resting in bed, NGT in 
place with daughter at bedside. Spoke about pt in rounds, per nurse he is going for surgery 
today (intestinal bypass of point of obstruction) and MD may initiate TPN after surgery but 
was still deciding, please consult RD if decision is made to start TPN. Pt reported he has 
been eating well at home despite losing weight d/t his chemo. He reported he lost 10-15 lbs 
in the past 2-3 months.  Pt was here in Oct 2019, pt was 170 lbs at that suggesting a 3% 
weight loss within 3 months which is not significant enough to meet ASPEN malnutrition 
criteria. He denied chewing or swallowing issues as well as any food allergies. Will 
continue to monitor. 



Principal Problems/Diagnoses: SBO



PMH: Significant for inoperable advanced pancreatic carcinoma, hypertension, coronary artery 
disease.



GI: Abd: soft, tender LBM: 2/11



Skin: no pressure ulcer recorded 



Labs: 2/21: Na 132  

2/18: Na 132, K 4.4, Cl 101, CO2 25, BUN 13, Cr 0.61, Gluc 116, Mg 1.8, POC Gluc 123-125, no 
Phos

2/14: Na 130, BUN 6, Creat 0.58, Glu 130, Ca 7.6

2/12:Na 136, K 3.9, Cl 102, CO2 26, BUN 19, Creat 0.73, Gluc 104, Ca 8.1, Phos 3.1, Mg 1.9, 
Tbili 0.7 



Meds: aztrenoam, metroprolol, pepcid, NaCl, zofran, vancomycin,



Ht:65 in (not 60 inches)          

Wt: 170 lbs (2/19)  165 lbs (2/14)      

BMI: 32.7 kg/m^2          

IBW: 136lbs 



Malnutrition Evaluation (2/12/20) 

The patient does not meet criteria for a specified degree of malnutrition at this time. Will 
re-evaluate at follow-up as appropriate. 



Energy intake:-pt denies poor oral intake

Weight loss:. He reported he lost 10-15 lbs in the past 2-3 months.  Pt was here in Oct 
2018, pt was 170 lbs at that suggesting a 3% weight loss within 3 months which is not 
significant enough to meet ASPEN malnutrition criteria

Fat loss: Mild- dark eyes 

Muscle loss: Mild-temporal wasting  



Fluid accumulation: No cyanosis or edema.

Functional Status: unable to evaluate





Nutrition Prescription (Diet Order): Standard TPN at 75 mL/hr and 25 g/day lipids (total 
volume 1200 mL, Dextrose 30% 500 mL (270 g/day), AA 10% 500 mL/ (90 g/day), 25 gm/day lipids 
with standard electrolytes, trace elements, and multivitamins, thiamine, folic acid, 
famotidine, and insulin 10 units/day.

-provides 1528 kcal and 90 g protein



Estimated Nutritional Needs:

Calories: 6784-0610 kcal/day (18-22  kcal/kg/day) Weight used : 75 kg 

Protein :  g protein/day (1-1.5 gram/kg/day ) Weight used: 75 kg 



Diet Adequacy: meeting calorie needs, meeting protein needs



Diet Education Needs Assessment: Diet education not indicated, patient on 
temporary/transition diet.



Nutrition Care Level: high 



Nutrition Diagnosis: Altered GI function related to SBO as evidenced by need for TPN



Goal: Patient will meet % of estimated needs by follow up 



Progress: Current TPN order meet's pt's estimated needs



Interventions:

- TPN - Rate, Route, Collaboration with other providers



Monitoring/Evaluation: 

Total energy intake, Total protein intake, Formula/Solution, Weight change



Signed: Indira Cota RD, LD

## 2020-02-21 NOTE — NUR
IM- progress note

O/N no evens

ROS: no f/c/s/HA/cp/confusion/focal limb weakness/dizziness/vision changes



v/s revd

PE

tired appearing

facial asymmetry

ns1s2

mod bs

soft; mild tender in mid abdomen

no e/t

skin dry

flat affect

a&ox3; moe



labs/med revd



A/P: 74yoM

SBO

Pancreastic adenocarcinoma

Diverticulosis

Cholelithiasis

Right lung nodule

GERD

HTN

FOrmer smoker



PLAN

Place NGT; IVF; NPO; antiemetics; NGT; Sx consult; GI eval; 

IV ppi and SCD; 



2/10 ambulate; sx eval; check lytes

2/11 check labs

2/12 cont care; check labs; f/u sx plan; Ambulate; optimize lytes

2/13 s/p surgery; check labs

2/14 check labs; started on TPN

2/15 use NS for Hyponatremia; cont care 

2/16 Sepsis- got vanco; fevers resolving; Na improving; cont IVF and broad spec 
abx; vanco trough pending cct>35mins

2/17 Afebrile overnight; leukocytosis improving; cont abx; monitor closely; 

2/18 check labs; 

2/19 ambulate pt; diet being advanced.

2/20 check labs; cont CLD; ambulate

2/21 Ileus; cont ambulation; cont diet; check Na

Lane Hardin MD, PhD.

## 2020-02-21 NOTE — NUR
DRESSING CHANGED. GAVE BEDSIDE SHIFT REPORT TO ONCOMING NURSE. CALL LIGHT WITHIN REACH. 
PATIENT IN BED. DAUGHTER AT THE BEDSIDE.

## 2020-02-21 NOTE — NUR
Report given to night shift. Respiration even and unlabored without SOB. Call light in 
reach. Right NG tube intact and inpalced. Indwelling morrison catheter intact and in placed. 
Call light in reach.

## 2020-02-22 VITALS — DIASTOLIC BLOOD PRESSURE: 88 MMHG | SYSTOLIC BLOOD PRESSURE: 153 MMHG

## 2020-02-22 VITALS — DIASTOLIC BLOOD PRESSURE: 92 MMHG | SYSTOLIC BLOOD PRESSURE: 165 MMHG

## 2020-02-22 VITALS — SYSTOLIC BLOOD PRESSURE: 153 MMHG | DIASTOLIC BLOOD PRESSURE: 88 MMHG

## 2020-02-22 VITALS — SYSTOLIC BLOOD PRESSURE: 147 MMHG | DIASTOLIC BLOOD PRESSURE: 92 MMHG

## 2020-02-22 VITALS — SYSTOLIC BLOOD PRESSURE: 143 MMHG | DIASTOLIC BLOOD PRESSURE: 78 MMHG

## 2020-02-22 VITALS — SYSTOLIC BLOOD PRESSURE: 162 MMHG | DIASTOLIC BLOOD PRESSURE: 99 MMHG

## 2020-02-22 VITALS — DIASTOLIC BLOOD PRESSURE: 95 MMHG | SYSTOLIC BLOOD PRESSURE: 154 MMHG

## 2020-02-22 VITALS — DIASTOLIC BLOOD PRESSURE: 78 MMHG | SYSTOLIC BLOOD PRESSURE: 143 MMHG

## 2020-02-22 LAB
ANION GAP SERPL CALC-SCNC: 10.7 MMOL/L (ref 8–16)
BUN SERPL-MCNC: 13 MG/DL (ref 7–26)
BUN/CREAT SERPL: 23 (ref 6–25)
CALCIUM SERPL-MCNC: 8.1 MG/DL (ref 8.4–10.2)
CHLORIDE SERPL-SCNC: 106 MMOL/L (ref 98–107)
CO2 SERPL-SCNC: 21 MMOL/L (ref 22–29)
DEPRECATED PHOSPHATE SERPL-MCNC: 3.3 MG/DL (ref 2.3–4.7)
EGFRCR SERPLBLD CKD-EPI 2021: > 60 ML/MIN (ref 60–?)
GLUCOSE SERPLBLD-MCNC: 117 MG/DL (ref 74–118)
MAGNESIUM SERPL-MCNC: 1.9 MG/DL (ref 1.3–2.1)
POTASSIUM SERPL-SCNC: 4.7 MMOL/L (ref 3.5–5.1)
SODIUM SERPL-SCNC: 133 MMOL/L (ref 136–145)

## 2020-02-22 RX ADMIN — METOPROLOL TARTRATE PRN MG: 1 INJECTION, SOLUTION INTRAVENOUS at 00:28

## 2020-02-22 RX ADMIN — AZTREONAM SCH MLS/HR: 1 INJECTION, SOLUTION INTRAVENOUS at 16:50

## 2020-02-22 RX ADMIN — METOPROLOL TARTRATE SCH MG: 25 TABLET, FILM COATED ORAL at 00:28

## 2020-02-22 RX ADMIN — AZTREONAM SCH MLS/HR: 1 INJECTION, SOLUTION INTRAVENOUS at 00:32

## 2020-02-22 RX ADMIN — GUAIFENESIN AND DEXTROMETHORPHAN PRN ML: 100; 10 SYRUP ORAL at 01:01

## 2020-02-22 RX ADMIN — SODIUM CHLORIDE SCH MLS/HR: 9 INJECTION, SOLUTION INTRAVENOUS at 00:15

## 2020-02-22 RX ADMIN — METOPROLOL TARTRATE SCH MG: 25 TABLET, FILM COATED ORAL at 06:15

## 2020-02-22 RX ADMIN — AZTREONAM SCH MLS/HR: 1 INJECTION, SOLUTION INTRAVENOUS at 09:43

## 2020-02-22 RX ADMIN — SODIUM CHLORIDE SCH MLS/HR: 9 INJECTION, SOLUTION INTRAVENOUS at 08:15

## 2020-02-22 RX ADMIN — METOPROLOL TARTRATE SCH MG: 25 TABLET, FILM COATED ORAL at 17:36

## 2020-02-22 RX ADMIN — FAMOTIDINE SCH MG: 10 INJECTION, SOLUTION INTRAVENOUS at 21:20

## 2020-02-22 RX ADMIN — VANCOMYCIN HYDROCHLORIDE SCH MLS/HR: 1 INJECTION, SOLUTION INTRAVENOUS at 18:19

## 2020-02-22 RX ADMIN — GUAIFENESIN AND DEXTROMETHORPHAN PRN ML: 100; 10 SYRUP ORAL at 13:07

## 2020-02-22 RX ADMIN — METOPROLOL TARTRATE SCH MG: 25 TABLET, FILM COATED ORAL at 12:18

## 2020-02-22 RX ADMIN — FAMOTIDINE SCH MG: 10 INJECTION, SOLUTION INTRAVENOUS at 09:44

## 2020-02-22 NOTE — NUR
IM- progress note

O/N no evens

ROS: no f/c/s/HA/cp/confusion/focal limb weakness/dizziness/vision changes



v/s revd

PE

tired appearing

facial asymmetry

ns1s2

mod bs

soft; mild tender in mid abdomen

no e/t

skin dry

flat affect

a&ox3; moe



labs/med revd



A/P: 74yoM

SBO

Pancreastic adenocarcinoma

Diverticulosis

Cholelithiasis

Right lung nodule

GERD

HTN

FOrmer smoker



PLAN

Place NGT; IVF; NPO; antiemetics; NGT; Sx consult; GI eval; 

IV ppi and SCD; 



2/10 ambulate; sx eval; check lytes

2/11 check labs

2/12 cont care; check labs; f/u sx plan; Ambulate; optimize lytes

2/13 s/p surgery; check labs

2/14 check labs; started on TPN

2/15 use NS for Hyponatremia; cont care 

2/16 Sepsis- got vanco; fevers resolving; Na improving; cont IVF and broad spec 
abx; vanco trough pending cct>35mins

2/17 Afebrile overnight; leukocytosis improving; cont abx; monitor closely; 

2/18 check labs; 

2/19 ambulate pt; diet being advanced.

2/20 check labs; cont CLD; ambulate

2/21 Ileus; cont ambulation; cont diet; check Na

2/22 control BP; check lytes; 

Lane Hardin MD, PhD.

## 2020-02-22 NOTE — NUR
Received patient lying in bed with eyes open. Respiration even and unlabored without SOB. 
Call light in reach. Family at bedside.

## 2020-02-23 VITALS — SYSTOLIC BLOOD PRESSURE: 146 MMHG | DIASTOLIC BLOOD PRESSURE: 95 MMHG

## 2020-02-23 VITALS — SYSTOLIC BLOOD PRESSURE: 142 MMHG | DIASTOLIC BLOOD PRESSURE: 92 MMHG

## 2020-02-23 VITALS — DIASTOLIC BLOOD PRESSURE: 93 MMHG | SYSTOLIC BLOOD PRESSURE: 159 MMHG

## 2020-02-23 VITALS — SYSTOLIC BLOOD PRESSURE: 159 MMHG | DIASTOLIC BLOOD PRESSURE: 93 MMHG

## 2020-02-23 VITALS — SYSTOLIC BLOOD PRESSURE: 162 MMHG | DIASTOLIC BLOOD PRESSURE: 86 MMHG

## 2020-02-23 VITALS — SYSTOLIC BLOOD PRESSURE: 161 MMHG | DIASTOLIC BLOOD PRESSURE: 96 MMHG

## 2020-02-23 RX ADMIN — AZTREONAM SCH MLS/HR: 1 INJECTION, SOLUTION INTRAVENOUS at 08:59

## 2020-02-23 RX ADMIN — METOPROLOL TARTRATE SCH MG: 25 TABLET, FILM COATED ORAL at 12:41

## 2020-02-23 RX ADMIN — AZTREONAM SCH MLS/HR: 1 INJECTION, SOLUTION INTRAVENOUS at 17:00

## 2020-02-23 RX ADMIN — METOPROLOL TARTRATE SCH MG: 25 TABLET, FILM COATED ORAL at 05:34

## 2020-02-23 RX ADMIN — VANCOMYCIN HYDROCHLORIDE SCH MLS/HR: 1 INJECTION, SOLUTION INTRAVENOUS at 18:13

## 2020-02-23 RX ADMIN — SODIUM CHLORIDE SCH MLS/HR: 9 INJECTION, SOLUTION INTRAVENOUS at 07:40

## 2020-02-23 RX ADMIN — FAMOTIDINE SCH MG: 10 INJECTION, SOLUTION INTRAVENOUS at 21:00

## 2020-02-23 RX ADMIN — AZTREONAM SCH MLS/HR: 1 INJECTION, SOLUTION INTRAVENOUS at 01:00

## 2020-02-23 RX ADMIN — FAMOTIDINE SCH MG: 10 INJECTION, SOLUTION INTRAVENOUS at 08:59

## 2020-02-23 RX ADMIN — GUAIFENESIN AND DEXTROMETHORPHAN PRN ML: 100; 10 SYRUP ORAL at 09:28

## 2020-02-23 RX ADMIN — GUAIFENESIN AND DEXTROMETHORPHAN PRN ML: 100; 10 SYRUP ORAL at 00:09

## 2020-02-23 RX ADMIN — METOPROLOL TARTRATE SCH MG: 25 TABLET, FILM COATED ORAL at 18:14

## 2020-02-23 RX ADMIN — METOPROLOL TARTRATE SCH MG: 25 TABLET, FILM COATED ORAL at 00:00

## 2020-02-23 NOTE — NUR
IM- progress note

O/N no evens

ROS: no f/c/s/HA/cp/confusion/focal limb weakness/dizziness/vision changes



v/s revd

PE

tired appearing

facial asymmetry

ns1s2

mod bs

soft; mild tender in mid abdomen

no e/t

skin dry

flat affect

a&ox3; moe



labs/med revd



A/P: 74yoM

SBO

Pancreastic adenocarcinoma

Diverticulosis

Cholelithiasis

Right lung nodule

GERD

HTN

FOrmer smoker



PLAN

Place NGT; IVF; NPO; antiemetics; NGT; Sx consult; GI eval; 

IV ppi and SCD; 



2/10 ambulate; sx eval; check lytes

2/11 check labs

2/12 cont care; check labs; f/u sx plan; Ambulate; optimize lytes

2/13 s/p surgery; check labs

2/14 check labs; started on TPN

2/15 use NS for Hyponatremia; cont care 

2/16 Sepsis- got vanco; fevers resolving; Na improving; cont IVF and broad spec 
abx; vanco trough pending cct>35mins

2/17 Afebrile overnight; leukocytosis improving; cont abx; monitor closely; 

2/18 check labs; 

2/19 ambulate pt; diet being advanced.

2/20 check labs; cont CLD; ambulate

2/21 Ileus; cont ambulation; cont diet; check Na

2/22 control BP; check lytes; 

2/23 supportive care; 

Lane Hardin MD, PhD.

## 2020-02-23 NOTE — NUR
ASSISTED PATIENT TO BEDSIDE COMMODE AND BACK TO BED. TOLERATED WELL. RUSH CATH CARE 
PROVIDED USING CASTILE SOAP.

## 2020-02-23 NOTE — NUR
Received patient lying in bed with eyes open. Right nare NG tube intact, in placed attached 
to low suction. Indwelling urinary catheter in placed, intact with small amount of 
yellow-colored urine to bag. Call light in reach.

## 2020-02-24 VITALS — SYSTOLIC BLOOD PRESSURE: 160 MMHG | DIASTOLIC BLOOD PRESSURE: 91 MMHG

## 2020-02-24 VITALS — SYSTOLIC BLOOD PRESSURE: 137 MMHG | DIASTOLIC BLOOD PRESSURE: 81 MMHG

## 2020-02-24 VITALS — SYSTOLIC BLOOD PRESSURE: 134 MMHG | DIASTOLIC BLOOD PRESSURE: 82 MMHG

## 2020-02-24 VITALS — DIASTOLIC BLOOD PRESSURE: 92 MMHG | SYSTOLIC BLOOD PRESSURE: 134 MMHG

## 2020-02-24 VITALS — SYSTOLIC BLOOD PRESSURE: 144 MMHG | DIASTOLIC BLOOD PRESSURE: 88 MMHG

## 2020-02-24 VITALS — SYSTOLIC BLOOD PRESSURE: 153 MMHG | DIASTOLIC BLOOD PRESSURE: 88 MMHG

## 2020-02-24 LAB
ALBUMIN SERPL-MCNC: 2 G/DL (ref 3.5–5)
ALBUMIN/GLOB SERPL: 0.5 {RATIO} (ref 0.8–2)
ALP SERPL-CCNC: 252 IU/L (ref 40–150)
ALT SERPL-CCNC: 19 IU/L (ref 0–55)
ANION GAP SERPL CALC-SCNC: 9.1 MMOL/L (ref 8–16)
BASOPHILS # BLD AUTO: 0 10*3/UL (ref 0–0.1)
BASOPHILS NFR BLD AUTO: 0.2 % (ref 0–1)
BUN SERPL-MCNC: 14 MG/DL (ref 7–26)
BUN/CREAT SERPL: 26 (ref 6–25)
CALCIUM SERPL-MCNC: 7.7 MG/DL (ref 8.4–10.2)
CHLORIDE SERPL-SCNC: 102 MMOL/L (ref 98–107)
CO2 SERPL-SCNC: 24 MMOL/L (ref 22–29)
DEPRECATED NEUTROPHILS # BLD AUTO: 11.6 10*3/UL (ref 2.1–6.9)
EGFRCR SERPLBLD CKD-EPI 2021: > 60 ML/MIN (ref 60–?)
EOSINOPHIL # BLD AUTO: 0.1 10*3/UL (ref 0–0.4)
EOSINOPHIL NFR BLD AUTO: 0.6 % (ref 0–6)
ERYTHROCYTE [DISTWIDTH] IN CORD BLOOD: 15.9 % (ref 11.7–14.4)
GLOBULIN PLAS-MCNC: 3.9 G/DL (ref 2.3–3.5)
GLUCOSE SERPLBLD-MCNC: 124 MG/DL (ref 74–118)
HCT VFR BLD AUTO: 26 % (ref 38.2–49.6)
HGB BLD-MCNC: 8.4 G/DL (ref 14–18)
LYMPHOCYTES # BLD: 1.2 10*3/UL (ref 1–3.2)
LYMPHOCYTES NFR BLD AUTO: 8.6 % (ref 18–39.1)
MAGNESIUM SERPL-MCNC: 1.8 MG/DL (ref 1.3–2.1)
MCH RBC QN AUTO: 30.9 PG (ref 28–32)
MCHC RBC AUTO-ENTMCNC: 32.3 G/DL (ref 31–35)
MCV RBC AUTO: 95.6 FL (ref 81–99)
MONOCYTES # BLD AUTO: 0.7 10*3/UL (ref 0.2–0.8)
MONOCYTES NFR BLD AUTO: 5.2 % (ref 4.4–11.3)
NEUTS SEG NFR BLD AUTO: 84.7 % (ref 38.7–80)
PLATELET # BLD AUTO: 443 X10E3/UL (ref 140–360)
POTASSIUM SERPL-SCNC: 4.1 MMOL/L (ref 3.5–5.1)
RBC # BLD AUTO: 2.72 X10E6/UL (ref 4.3–5.7)
SODIUM SERPL-SCNC: 131 MMOL/L (ref 136–145)
TRIGL SERPL-MCNC: 68 MG/DL (ref 0–149)

## 2020-02-24 RX ADMIN — SODIUM CHLORIDE SCH MLS/HR: 9 INJECTION, SOLUTION INTRAVENOUS at 12:27

## 2020-02-24 RX ADMIN — FAMOTIDINE SCH MG: 10 INJECTION, SOLUTION INTRAVENOUS at 20:52

## 2020-02-24 RX ADMIN — METOPROLOL TARTRATE SCH MG: 25 TABLET, FILM COATED ORAL at 12:28

## 2020-02-24 RX ADMIN — AZTREONAM SCH MLS/HR: 1 INJECTION, SOLUTION INTRAVENOUS at 09:00

## 2020-02-24 RX ADMIN — METOPROLOL TARTRATE SCH MG: 25 TABLET, FILM COATED ORAL at 17:31

## 2020-02-24 RX ADMIN — AZTREONAM SCH MLS/HR: 1 INJECTION, SOLUTION INTRAVENOUS at 16:32

## 2020-02-24 RX ADMIN — VANCOMYCIN HYDROCHLORIDE SCH MLS/HR: 1 INJECTION, SOLUTION INTRAVENOUS at 17:31

## 2020-02-24 RX ADMIN — METOPROLOL TARTRATE SCH MG: 25 TABLET, FILM COATED ORAL at 05:19

## 2020-02-24 RX ADMIN — AZTREONAM SCH MLS/HR: 1 INJECTION, SOLUTION INTRAVENOUS at 00:20

## 2020-02-24 RX ADMIN — GUAIFENESIN AND DEXTROMETHORPHAN PRN ML: 100; 10 SYRUP ORAL at 00:00

## 2020-02-24 RX ADMIN — METOPROLOL TARTRATE SCH MG: 25 TABLET, FILM COATED ORAL at 00:00

## 2020-02-24 RX ADMIN — FAMOTIDINE SCH MG: 10 INJECTION, SOLUTION INTRAVENOUS at 09:00

## 2020-02-24 RX ADMIN — SODIUM CHLORIDE PRN MG: 900 INJECTION INTRAVENOUS at 06:45

## 2020-02-24 NOTE — NUR
IM- progress note

O/N no evens

ROS: no f/c/s/HA/cp/confusion/focal limb weakness/dizziness/vision changes



v/s revd

PE

tired appearing

facial asymmetry

ns1s2

mod bs

soft; mild tender in mid abdomen

no e/t

skin dry

flat affect

a&ox3; moe



labs/med revd



A/P: 74yoM

SBO

Pancreastic adenocarcinoma

Diverticulosis

Cholelithiasis

Right lung nodule

GERD

HTN

FOrmer smoker



PLAN

Place NGT; IVF; NPO; antiemetics; NGT; Sx consult; GI eval; 

IV ppi and SCD; 



2/10 ambulate; sx eval; check lytes

2/11 check labs

2/12 cont care; check labs; f/u sx plan; Ambulate; optimize lytes

2/13 s/p surgery; check labs

2/14 check labs; started on TPN

2/15 use NS for Hyponatremia; cont care 

2/16 Sepsis- got vanco; fevers resolving; Na improving; cont IVF and broad spec 
abx; vanco trough pending cct>35mins

2/17 Afebrile overnight; leukocytosis improving; cont abx; monitor closely; 

2/18 check labs; 

2/19 ambulate pt; diet being advanced.

2/20 check labs; cont CLD; ambulate

2/21 Ileus; cont ambulation; cont diet; check Na

2/22 control BP; check lytes; 

2/23 supportive care; 

2/24 [facility] esperanza Hardin MD, PhD.

## 2020-02-24 NOTE — DIAGNOSTIC IMAGING REPORT
EXAMINATION: CT of the abdomen and pelvis without contrast.

 

TECHNIQUE: Spiral CT images of the abdomen and pelvis were performed from the

lung bases to the lesser trochanters.  No intravenous contrast was given per

physician's request. Oral Gastrografin was given. Coronal and sagittal

reformatted images were obtained.



COMPARISON:  None.



CLINICAL HISTORY:Small bowel obstruction

     

DISCUSSION: ABSENCE OF INTRAVENOUS CONTRAST DECREASES SENSITIVITY FOR DETECTION

OF FOCAL LESIONS AND VASCULAR PATHOLOGY.



ABDOMEN/PELVIS:



LOWER THORAX: Moderate right-sided pleural effusion and associated atelectasis

of the right lower lobe. Multiple linear opacities in the left lower lobe,

likely reflecting subsegmental atelectasis or scarring.

Nodular thickening of the major and minor fissures (best seen in sagittal

images 76-97). 

Atherosclerotic calcification of the coronary arteries.

Distal portion of Central line noted in the distal SVC.

Enteric tube has distal tip in the region of the pylorus.



HEPATOBILIARY: No focal hepatic lesions.  No intra or extrahepatic biliary

ductal dilation.



GALLBLADDER: Gallbladder is moderately distended. Multiple 2-3 mm calcified

gallstones are noted in the dependent portion of the gallbladder lumen. No wall

thickening     



SPLEEN: No splenomegaly.



PANCREAS: No focal masses. Mild pancreatic ductal dilation, which measures

approximately 3-4 mm at the pancreatic body. Marked pancreatic atrophy.



ADRENALS: No adrenal nodules.



KIDNEYS/URETERS: No hydronephrosis, stones, or contour abnormalities.



PELVIC ORGANS/BLADDER: Bladder is decompressed and there is a Bryant catheter in

place.



PERITONEUM/RETROPERITONEUM: Moderate intra-abdominal ascites.



LYMPH NODES: No intra-abdominal,retroperitoneal, pelvic or inguinal

lymphadenopathy.



VESSELS: Atherosclerotic calcification of the abdominal aorta and iliac

vessels.



GI TRACT: No bowel dilation or evidence of obstruction. Contrast is noted from

the stomach to the proximal large bowel. Marked diverticulosis of the distal

descending and sigmoid colon, without definite evidence of diverticulitis.



BONES AND SOFT TISSUES: No aggressive lytic lesions. Marked multilevel

degenerated discs in the lower thoracic and lumbosacral spine, worse at L4-L5

and L5-S1. Generalized soft tissue edema.



IMPRESSION: 





1. No small or large bowel dilation. Contrast is noted from the stomach to the

proximal large bowel, without evidence of obstruction.



2. Marked diverticulosis of the distal descending and sigmoid colon, without

diverticulitis.

3. Moderate right-sided pleural effusion and associated atelectasis of the

right lower lobe. Moderate ascites and generalized soft tissue edema suggesting

fluid overload.

4. Cholelithiasis, without CT evidence of cholecystitis.

5. Nodular thickening of the right major and minor fissures. Recommend CT chest

for further evaluation.

6. Mild pancreatic ductal dilation and marked pancreatic atrophy. No definite

focal lesion is identified, however, exam is limited bilateral intravenous

contrast.



Signed by: Dr. Darrius Logan M.D. on 2/24/2020 7:36 PM

## 2020-02-25 VITALS — SYSTOLIC BLOOD PRESSURE: 156 MMHG | DIASTOLIC BLOOD PRESSURE: 97 MMHG

## 2020-02-25 VITALS — SYSTOLIC BLOOD PRESSURE: 150 MMHG | DIASTOLIC BLOOD PRESSURE: 99 MMHG

## 2020-02-25 VITALS — DIASTOLIC BLOOD PRESSURE: 95 MMHG | SYSTOLIC BLOOD PRESSURE: 152 MMHG

## 2020-02-25 VITALS — SYSTOLIC BLOOD PRESSURE: 153 MMHG | DIASTOLIC BLOOD PRESSURE: 96 MMHG

## 2020-02-25 VITALS — SYSTOLIC BLOOD PRESSURE: 201 MMHG | DIASTOLIC BLOOD PRESSURE: 91 MMHG

## 2020-02-25 VITALS — SYSTOLIC BLOOD PRESSURE: 154 MMHG | DIASTOLIC BLOOD PRESSURE: 88 MMHG

## 2020-02-25 VITALS — SYSTOLIC BLOOD PRESSURE: 201 MMHG | DIASTOLIC BLOOD PRESSURE: 99 MMHG

## 2020-02-25 VITALS — DIASTOLIC BLOOD PRESSURE: 65 MMHG | SYSTOLIC BLOOD PRESSURE: 145 MMHG

## 2020-02-25 RX ADMIN — METOPROLOL TARTRATE SCH MG: 25 TABLET, FILM COATED ORAL at 17:06

## 2020-02-25 RX ADMIN — AZTREONAM SCH MLS/HR: 1 INJECTION, SOLUTION INTRAVENOUS at 00:11

## 2020-02-25 RX ADMIN — FAMOTIDINE SCH MG: 10 INJECTION, SOLUTION INTRAVENOUS at 07:44

## 2020-02-25 RX ADMIN — PROMETHAZINE HYDROCHLORIDE PRN MG: 25 INJECTION, SOLUTION INTRAMUSCULAR; INTRAVENOUS at 11:40

## 2020-02-25 RX ADMIN — SODIUM CHLORIDE PRN MG: 900 INJECTION INTRAVENOUS at 15:55

## 2020-02-25 RX ADMIN — SODIUM CHLORIDE PRN MG: 900 INJECTION INTRAVENOUS at 20:02

## 2020-02-25 RX ADMIN — SODIUM CHLORIDE SCH MLS/HR: 9 INJECTION, SOLUTION INTRAVENOUS at 11:41

## 2020-02-25 RX ADMIN — METOPROLOL TARTRATE SCH MG: 25 TABLET, FILM COATED ORAL at 12:14

## 2020-02-25 RX ADMIN — PROMETHAZINE HYDROCHLORIDE PRN MG: 25 INJECTION, SOLUTION INTRAMUSCULAR; INTRAVENOUS at 18:34

## 2020-02-25 RX ADMIN — AZTREONAM SCH MLS/HR: 1 INJECTION, SOLUTION INTRAVENOUS at 17:31

## 2020-02-25 RX ADMIN — METOPROLOL TARTRATE SCH MG: 25 TABLET, FILM COATED ORAL at 00:10

## 2020-02-25 RX ADMIN — GUAIFENESIN AND DEXTROMETHORPHAN PRN ML: 100; 10 SYRUP ORAL at 04:45

## 2020-02-25 RX ADMIN — FAMOTIDINE SCH MG: 10 INJECTION, SOLUTION INTRAVENOUS at 20:02

## 2020-02-25 RX ADMIN — METOPROLOL TARTRATE SCH MG: 25 TABLET, FILM COATED ORAL at 05:16

## 2020-02-25 RX ADMIN — SODIUM CHLORIDE PRN MG: 900 INJECTION INTRAVENOUS at 07:44

## 2020-02-25 RX ADMIN — AZTREONAM SCH MLS/HR: 1 INJECTION, SOLUTION INTRAVENOUS at 09:00

## 2020-02-25 NOTE — NUR
WALKING ROUNDS PERFORMED, RECEIVED PT LAYING FOWLERS IN BED, AAOX3, RR EVEN AND NON-LABORED, 
ON ROOM AIR. PT REPORTS THAT HE KEEPS VOMITING, MONITORED PATIENT AND PATIENT COUGHS UP 
PHLEGM AND SPITS IT INTO A TOWEL AND REPORTS THAT HE IS VOMITING. PROVIDED PT WITH EMESIS 
BAG TO COLLECT EMESIS. WILL CONTINUE TO MONITOR. LEFT PT LAYING FOWLERS IN BED, BED IN LOW 
LOCKED POSITION, SIDE RAILS UPX2, CALL LIGHT AND PHONE WITHIN REACH.

## 2020-02-25 NOTE — NUR
Nutrition Intervention Note



RD Recommendation(s) for Physician: 

- Continue TPN per current order, wean per MD.

- When feasible, advance diet to full liquids with goal of GI Soft.

- Recommend Ensure Compact when diet advanced. 



- When medically appropriate advance to to GI Soft. Recommend Ensure Clear TID when diet 
advanced. 



Nutrition reason for involvement: follow up, TPN



RD Assessment

2/25: Follow up. Pt continues with NGT and on TPN at 25 ml/hr currently, TPN now being 
weaned and diet advanced to clear liquids per RN. Pt discussed during MDR, plan to remove 
NGT today and SNF evaluation pending. Chart reviewed. Will continue to monitor. 



2/21: Follow up. Pt was discussed during rounds. Pt still has NGT and is receiving TPN. Pt 
is tolerating clear liquids today per RN and TPN is going to be continued. Will continue to 
monitor.



2/18: Follow up. Pt sleeping at time of visit, no family present. Pt continues on TPN at 75 
ml/hr and NGT to LIWS with very little output. Pt discussed during am rounds, possible D/C 
of NGT today per RN. No GI distress reported and no BM reported. Chart reviewed. TPN and 
diet rec's provided. Will continue to monitor. 



2/14: Follow up. Pt continues to have NGT in place and was started on TPN last night. TPN 
recommendation was provided to RN. Will continue to monitor.

 

2/12: 74 YOM admitted for SBO with PMH listed below. The pt was seen resting in bed, NGT in 
place with daughter at bedside. Spoke about pt in rounds, per nurse he is going for surgery 
today (intestinal bypass of point of obstruction) and MD may initiate TPN after surgery but 
was still deciding, please consult RD if decision is made to start TPN. Pt reported he has 
been eating well at home despite losing weight d/t his chemo. He reported he lost 10-15 lbs 
in the past 2-3 months.  Pt was here in Oct 2019, pt was 170 lbs at that suggesting a 3% 
weight loss within 3 months which is not significant enough to meet ASPEN malnutrition 
criteria. He denied chewing or swallowing issues as well as any food allergies. Will 
continue to monitor. 



Principal Problems/Diagnoses: SBO



PMH: Significant for inoperable advanced pancreatic carcinoma, hypertension, coronary artery 
disease.



GI: Abd: soft, tender LBM: 2/23



Skin: abdominal incision 



Labs: 2/24: Na 131, K 4.1, Cl 102, CO2 24, BUN 14, C4r 0.54, Gluc 124, Mg 1.8, Corrected Ca 
9.1, POC Gluc 131-133



Meds: abx, pepcid, zofran 



Ht:65 in (not 60 inches)          

Wt: 170 lbs (2/19)  165 lbs (2/14)      

BMI: 32.7 kg/m^2          

IBW: 136lbs 



Malnutrition Evaluation (2/12/20) 

The patient does not meet criteria for a specified degree of malnutrition at this time. Will 
re-evaluate at follow-up as appropriate. 



Energy intake:-pt denies poor oral intake

Weight loss:. He reported he lost 10-15 lbs in the past 2-3 months.  Pt was here in Oct 
2018, pt was 170 lbs at that suggesting a 3% weight loss within 3 months which is not 
significant enough to meet ASPEN malnutrition criteria

Fat loss: Mild- dark eyes 

Muscle loss: Mild-temporal wasting  



Fluid accumulation: No cyanosis or edema.

Functional Status: unable to evaluate





Nutrition Prescription (Diet Order): clear liquids 



Standard TPN at 75 mL/hr and 25 g/day lipids (total volume 1200 mL, Dextrose 30% 500 mL (270 
g/day), AA 10% 500 mL/ (90 g/day), 25 gm/day lipids with standard electrolytes, trace 
elements, and multivitamins, thiamine, folic acid, famotidine, and insulin 10 units/day.

-provides 1528 kcal and 90 g protein



Estimated Nutritional Needs:

Calories: 3781-3681 kcal/day (18-22  kcal/kg/day) Weight used : 75 kg 

Protein :  g protein/day (1-1.5 gram/kg/day ) Weight used: 75 kg 



Diet Adequacy: meeting calorie needs, meeting protein needs



Diet Education Needs Assessment: Diet education not indicated, patient on 
temporary/transition diet.



Nutrition Care Level: high 



Nutrition Diagnosis: Altered GI function related to SBO as evidenced by need for TPN



Goal: Patient will meet % of estimated needs by follow up 



Progress: Current TPN order meet's pt's estimated needs



Interventions:

- TPN - Rate, Route, Collaboration with other providers



Monitoring/Evaluation: 

Total energy intake, Total protein intake, Formula/Solution, Weight change



Signed: Maddie Cosme RD, LD, CNSC

## 2020-02-25 NOTE — NUR
Dr. Nash here to see pt and he wants to start weening pt of TPN today. Received orders 
to decrease rate of TPN from 75ml/hr to 25ml/hr, then TPN is to be discontinued at midnight 
and then start D5NS 25ml/hr. Spoke with pharmacy. Per pharm tech - did not show refills.  I advised we sent script on 7/17 for a 90 day supply. Pharmacy still stating no refills are shown.

## 2020-02-25 NOTE — NUR
Ngtube discontinued at this time per physician orders. Well tolerated by pt. Denies any pain 
at this time.

## 2020-02-25 NOTE — NUR
CALLED TO PATIENT ROOM BY CALL LIGHT. PT REPORTS VOMITING. PT FOUND SITTING ON SIDE OF BED 
NOTED TO HAVE LIGHT BROWN EMESIS ON GOWN AND ON FLOOR. CLEANSED PATIENT AND CHANGED LINENS 
AND GOWN. REPOSITION PT IN BED FOR COMFORT. LEFT PT LAYING SEMI FOWLERS IN BED, BED IN LOW 
LOCKED POSITION, SIDE RAILS UPX2, CALL LIGHT AND PHONE WITHIN REACH.

## 2020-02-25 NOTE — NUR
IM- progress note

O/N no evens

ROS: no f/c/s/HA/cp/confusion/focal limb weakness/dizziness/vision changes



v/s revd

PE

tired appearing

facial asymmetry

ns1s2

mod bs

soft; mild tender in mid abdomen

no e/t

skin dry

flat affect

a&ox3; moe



labs/med revd



A/P: 74yoM

SBO

Pancreastic adenocarcinoma

Diverticulosis

Cholelithiasis

Right lung nodule

GERD

HTN

FOrmer smoker



PLAN

Place NGT; IVF; NPO; antiemetics; NGT; Sx consult; GI eval; 

IV ppi and SCD; 



2/10 ambulate; sx eval; check lytes

2/11 check labs

2/12 cont care; check labs; f/u sx plan; Ambulate; optimize lytes

2/13 s/p surgery; check labs

2/14 check labs; started on TPN

2/15 use NS for Hyponatremia; cont care 

2/16 Sepsis- got vanco; fevers resolving; Na improving; cont IVF and broad spec 
abx; vanco trough pending cct>35mins

2/17 Afebrile overnight; leukocytosis improving; cont abx; monitor closely; 

2/18 check labs; 

2/19 ambulate pt; diet being advanced.

2/20 check labs; cont CLD; ambulate

2/21 Ileus; cont ambulation; cont diet; check Na

2/22 control BP; check lytes; 

2/23 supportive care; 

2/24 SNF eval

2-25 cont care; f/u SNF eval; ambulate; d/w daughter at bedside; 

Lane Hardin MD, PhD. Pt called back from another task to schedule her Lt knee inj (9/13/18)    Pt was given xry report at that time

## 2020-02-25 NOTE — NUR
Pt received n bed. Ngtube to right nare and connected to low intermittent suction. Pt denies 
any pain at this time. Pt is aox4 and able to verbalize needs. 0 s/s of acute distress 
noted.

## 2020-02-26 VITALS — DIASTOLIC BLOOD PRESSURE: 88 MMHG | SYSTOLIC BLOOD PRESSURE: 164 MMHG

## 2020-02-26 VITALS — DIASTOLIC BLOOD PRESSURE: 98 MMHG | SYSTOLIC BLOOD PRESSURE: 166 MMHG

## 2020-02-26 VITALS — SYSTOLIC BLOOD PRESSURE: 164 MMHG | DIASTOLIC BLOOD PRESSURE: 95 MMHG

## 2020-02-26 VITALS — DIASTOLIC BLOOD PRESSURE: 58 MMHG | SYSTOLIC BLOOD PRESSURE: 120 MMHG

## 2020-02-26 VITALS — SYSTOLIC BLOOD PRESSURE: 169 MMHG | DIASTOLIC BLOOD PRESSURE: 100 MMHG

## 2020-02-26 VITALS — SYSTOLIC BLOOD PRESSURE: 157 MMHG | DIASTOLIC BLOOD PRESSURE: 87 MMHG

## 2020-02-26 VITALS — DIASTOLIC BLOOD PRESSURE: 100 MMHG | SYSTOLIC BLOOD PRESSURE: 169 MMHG

## 2020-02-26 LAB
ANION GAP SERPL CALC-SCNC: 12.3 MMOL/L (ref 8–16)
BASOPHILS # BLD AUTO: 0 10*3/UL (ref 0–0.1)
BASOPHILS NFR BLD AUTO: 0.2 % (ref 0–1)
BUN SERPL-MCNC: 19 MG/DL (ref 7–26)
BUN/CREAT SERPL: 29 (ref 6–25)
CALCIUM SERPL-MCNC: 8.3 MG/DL (ref 8.4–10.2)
CHLORIDE SERPL-SCNC: 100 MMOL/L (ref 98–107)
CO2 SERPL-SCNC: 28 MMOL/L (ref 22–29)
DEPRECATED NEUTROPHILS # BLD AUTO: 13.5 10*3/UL (ref 2.1–6.9)
EGFRCR SERPLBLD CKD-EPI 2021: > 60 ML/MIN (ref 60–?)
EOSINOPHIL # BLD AUTO: 0 10*3/UL (ref 0–0.4)
EOSINOPHIL NFR BLD AUTO: 0 % (ref 0–6)
ERYTHROCYTE [DISTWIDTH] IN CORD BLOOD: 16.3 % (ref 11.7–14.4)
GLUCOSE SERPLBLD-MCNC: 111 MG/DL (ref 74–118)
HCT VFR BLD AUTO: 26.7 % (ref 38.2–49.6)
HGB BLD-MCNC: 8.6 G/DL (ref 14–18)
LYMPHOCYTES # BLD: 0.6 10*3/UL (ref 1–3.2)
LYMPHOCYTES NFR BLD AUTO: 3.9 % (ref 18–39.1)
MCH RBC QN AUTO: 30.7 PG (ref 28–32)
MCHC RBC AUTO-ENTMCNC: 32.2 G/DL (ref 31–35)
MCV RBC AUTO: 95.4 FL (ref 81–99)
MONOCYTES # BLD AUTO: 0.8 10*3/UL (ref 0.2–0.8)
MONOCYTES NFR BLD AUTO: 5.1 % (ref 4.4–11.3)
NEUTS SEG NFR BLD AUTO: 90.1 % (ref 38.7–80)
PLATELET # BLD AUTO: 551 X10E3/UL (ref 140–360)
POTASSIUM SERPL-SCNC: 4.3 MMOL/L (ref 3.5–5.1)
RBC # BLD AUTO: 2.8 X10E6/UL (ref 4.3–5.7)
SODIUM SERPL-SCNC: 136 MMOL/L (ref 136–145)

## 2020-02-26 RX ADMIN — SODIUM CHLORIDE PRN MG: 900 INJECTION INTRAVENOUS at 03:50

## 2020-02-26 RX ADMIN — FAMOTIDINE SCH MG: 10 INJECTION, SOLUTION INTRAVENOUS at 08:00

## 2020-02-26 RX ADMIN — METOPROLOL TARTRATE SCH MG: 25 TABLET, FILM COATED ORAL at 12:00

## 2020-02-26 RX ADMIN — METOPROLOL TARTRATE SCH MG: 25 TABLET, FILM COATED ORAL at 17:06

## 2020-02-26 RX ADMIN — METOPROLOL TARTRATE SCH MG: 25 TABLET, FILM COATED ORAL at 00:19

## 2020-02-26 RX ADMIN — METOCLOPRAMIDE SCH MG: 5 INJECTION, SOLUTION INTRAMUSCULAR; INTRAVENOUS at 13:09

## 2020-02-26 RX ADMIN — GUAIFENESIN AND DEXTROMETHORPHAN PRN ML: 100; 10 SYRUP ORAL at 21:12

## 2020-02-26 RX ADMIN — METOPROLOL TARTRATE PRN MG: 1 INJECTION, SOLUTION INTRAVENOUS at 17:13

## 2020-02-26 RX ADMIN — FAMOTIDINE SCH MG: 10 INJECTION, SOLUTION INTRAVENOUS at 20:24

## 2020-02-26 RX ADMIN — METOCLOPRAMIDE SCH MG: 5 INJECTION, SOLUTION INTRAMUSCULAR; INTRAVENOUS at 21:53

## 2020-02-26 RX ADMIN — SODIUM CHLORIDE PRN MG: 900 INJECTION INTRAVENOUS at 20:46

## 2020-02-26 RX ADMIN — METOPROLOL TARTRATE SCH MG: 25 TABLET, FILM COATED ORAL at 05:15

## 2020-02-26 RX ADMIN — SODIUM CHLORIDE PRN MG: 900 INJECTION INTRAVENOUS at 08:48

## 2020-02-26 RX ADMIN — METOCLOPRAMIDE SCH MG: 5 INJECTION, SOLUTION INTRAMUSCULAR; INTRAVENOUS at 08:00

## 2020-02-26 RX ADMIN — METOPROLOL TARTRATE PRN MG: 1 INJECTION, SOLUTION INTRAVENOUS at 13:29

## 2020-02-26 RX ADMIN — PROMETHAZINE HYDROCHLORIDE PRN MG: 25 INJECTION, SOLUTION INTRAMUSCULAR; INTRAVENOUS at 07:04

## 2020-02-26 RX ADMIN — PROMETHAZINE HYDROCHLORIDE PRN MG: 25 INJECTION, SOLUTION INTRAMUSCULAR; INTRAVENOUS at 00:16

## 2020-02-26 RX ADMIN — SODIUM CHLORIDE PRN MG: 900 INJECTION INTRAVENOUS at 16:21

## 2020-02-26 NOTE — NUR
DAUGHTER APPROACHED ME AND WANT TO GET INFORMATION ON HOSPICE. SHE STATE [facility] CAME IN AND 
SPOKE WITH THEM, SHE IS GOING TO SPEAK WITH HER SISTER AND HER DAD THE PT AND SEE IF HE 
WANTS TO GET SKILLED SERVICES OR GO HOME WITH HOSPICE TO BE COMFORTABLE.

## 2020-02-26 NOTE — NUR
IM- progress note

O/N no evens

ROS: no f/c/s/HA/cp/confusion/focal limb weakness/dizziness/vision changes



v/s revd

PE

tired appearing

facial asymmetry

ns1s2

mod bs

soft; mild tender in mid abdomen

no e/t

skin dry

flat affect

a&ox3; moe



labs/med revd



A/P: 74yoM

SBO

Pancreastic adenocarcinoma

Diverticulosis

Cholelithiasis

Right lung nodule

GERD

HTN

FOrmer smoker



PLAN

Place NGT; IVF; NPO; antiemetics; NGT; Sx consult; GI eval; 

IV ppi and SCD; 



2/10 ambulate; sx eval; check lytes

2/11 check labs

2/12 cont care; check labs; f/u sx plan; Ambulate; optimize lytes

2/13 s/p surgery; check labs

2/14 check labs; started on TPN

2/15 use NS for Hyponatremia; cont care 

2/16 Sepsis- got vanco; fevers resolving; Na improving; cont IVF and broad spec 
abx; vanco trough pending cct>35mins

2/17 Afebrile overnight; leukocytosis improving; cont abx; monitor closely; 

2/18 check labs; 

2/19 ambulate pt; diet being advanced.

2/20 check labs; cont CLD; ambulate

2/21 Ileus; cont ambulation; cont diet; check Na

2/22 control BP; check lytes; 

2/23 supportive care; 

2/24 SNF eval

2-25 cont care; f/u SNF eval; ambulate; d/w daughter at bedside; 

2/26 check labs; NGT out; mobilize; start reglan 5mg IV q8.

Lane Hardin MD, PhD.

## 2020-02-26 NOTE — DIAGNOSTIC IMAGING REPORT
Hepatobiliary Scan with Gallbladder Ejection Fraction



Clinical information: 74 F with small bowel obstruction and cholecystitis. 

Vomiting x >1 week.



Report: Following intravenous administration of 5.8 millicuries of Tc-99m

mebrofenin, dynamic images of the abdomen in the anterior projection were

obtained through 60 minutes.  Sincalide (CCK analog) 1.6 micrograms was

administered intravenously over 30 minutes with additional imaging for

determination of gallbladder ejection fraction.



Perfusion to the liver is normal.  Extraction of tracer from the blood pool by

the liver parenchyma is normal.  Tracer is seen promptly within the biliary

tract.  The gallbladder begins to fill by 40 minutes post-injection of tracer. 

Tracer is seen in the small bowel by 30 minutes.  The gallbladder ejection

fraction with administration of sincalide is 81% (normal greater than 40%).



Impression: 



1.  Filling of the gallbladder excludes the diagnosis of acute cystic duct

obstruction/acute cholecystitis.

2.  Normal gallbladder ejection fraction of 81% does not support the clinical

diagnosis of chronic cholecystitis/gallbladder dyskinesia.



Signed by: Dr. Maira Velazco M.D. on 2/26/2020 9:45 PM

## 2020-02-27 VITALS — DIASTOLIC BLOOD PRESSURE: 88 MMHG | SYSTOLIC BLOOD PRESSURE: 162 MMHG

## 2020-02-27 VITALS — DIASTOLIC BLOOD PRESSURE: 83 MMHG | SYSTOLIC BLOOD PRESSURE: 146 MMHG

## 2020-02-27 VITALS — DIASTOLIC BLOOD PRESSURE: 81 MMHG | SYSTOLIC BLOOD PRESSURE: 153 MMHG

## 2020-02-27 VITALS — SYSTOLIC BLOOD PRESSURE: 143 MMHG | DIASTOLIC BLOOD PRESSURE: 85 MMHG

## 2020-02-27 VITALS — SYSTOLIC BLOOD PRESSURE: 144 MMHG | DIASTOLIC BLOOD PRESSURE: 88 MMHG

## 2020-02-27 RX ADMIN — SODIUM CHLORIDE PRN MG: 900 INJECTION INTRAVENOUS at 15:36

## 2020-02-27 RX ADMIN — METOCLOPRAMIDE SCH MG: 5 INJECTION, SOLUTION INTRAMUSCULAR; INTRAVENOUS at 05:22

## 2020-02-27 RX ADMIN — FAMOTIDINE SCH MG: 10 INJECTION, SOLUTION INTRAVENOUS at 08:22

## 2020-02-27 RX ADMIN — GUAIFENESIN AND DEXTROMETHORPHAN PRN ML: 100; 10 SYRUP ORAL at 05:23

## 2020-02-27 RX ADMIN — METOPROLOL TARTRATE SCH MG: 25 TABLET, FILM COATED ORAL at 01:00

## 2020-02-27 RX ADMIN — METOPROLOL TARTRATE SCH MG: 25 TABLET, FILM COATED ORAL at 05:22

## 2020-02-27 RX ADMIN — METOCLOPRAMIDE SCH MG: 5 INJECTION, SOLUTION INTRAMUSCULAR; INTRAVENOUS at 13:06

## 2020-02-27 RX ADMIN — GUAIFENESIN AND DEXTROMETHORPHAN PRN ML: 100; 10 SYRUP ORAL at 14:40

## 2020-02-27 RX ADMIN — METOPROLOL TARTRATE SCH MG: 25 TABLET, FILM COATED ORAL at 17:54

## 2020-02-27 RX ADMIN — SODIUM CHLORIDE PRN MG: 900 INJECTION INTRAVENOUS at 08:22

## 2020-02-27 RX ADMIN — SODIUM CHLORIDE PRN MG: 900 INJECTION INTRAVENOUS at 04:16

## 2020-02-27 RX ADMIN — METOPROLOL TARTRATE SCH MG: 25 TABLET, FILM COATED ORAL at 13:06

## 2020-02-27 NOTE — NUR
GAVE BEDSIDE SHIFT TO ONCOMING NURSE. PATIENT IN BED. CALL LIGHT WITHIN REACH. PATIENT IN NO 
PAIN OR DISTRESS. PATIENT IS A&OX3

## 2020-02-27 NOTE — NUR
RECEIVED REPORT FROM PREVIOUS NURSE. CALL LIGHT WITHIN REACH. PATIENT IN BED. WIFE AND 
DAUGHTER AT THE BEDSIDE WAITING TO BE DISCHARGED.

## 2020-02-27 NOTE — NUR
SKILLED NURSING FACILITY DISCHARGE INFORMATION 

PATIENT HAS BEEN ACCEPTED TO: 

NAME:[facility]

ADDRESS: [address]

ACCEPTING :REGINE LOW MD:KAREN

ROOM:[id number] 

NURSE CALL REPORT TO: [phone]

IMM SIGNED AND OBTAINED (if applicable): 

THE FOLLOWING DOCUMENTS MUST ACCOMPANY PATIENT FOR TRANSFER:

COPIED CHART:PACKET

## 2020-02-27 NOTE — NUR
IM- progress note

O/N see below

ROS; no f/c/s/N/V/D/HA/vision changes/confusion/skin rash/focal limb weakness



v/s revd

PE

anicteric

ns1s2

mod bs; scattered wheezing

soft nt nd

2+ leg edema B/L 

Scrotal edema 2 plus (nurse present)

skin dry

flat affect

a&ox3; moe



labs/meds revd



A/P: 

UTI- IV abx

AECHF- diastolic- diuretics

Pulmonary edema- IV diuretics

Scrotal edema- iv diuretics

Hyponatremia- check labs

Hypokalemia- check labs

Obesity- hab1c/lipids

CAD- hx CABG and stent; cont home meds; 

BMI 31.8

Prop: lovenox; 

Dispo: f/u labs; f/u edema



2/26 Hba1/LDL 5.9/30; check labs; cont care; 

2/27 phenergan for nausea, PRN; continue reglan; HIDA shows normal GB EF.  
Palliative care eval. 



Lane Hardin MD, PhD.

## 2020-02-27 NOTE — NUR
PATIENT LEFT VIA STRETCHER TO [facility] AT [facility]. DAUGHTER AND WIFE COLLECTED ALL THE 
BELONGINGS. PATIENT IN NO PAIN OR DISTRESS. REPORT AND DISCHARGE INFORMATION WAS GIVEN TO 
THE PATIENT FROM THE DAY SHIFT NURSE.

## 2020-02-28 ENCOUNTER — HOSPITAL ENCOUNTER (EMERGENCY)
Dept: HOSPITAL 88 - ER | Age: 75
Discharge: NURSING, REHAB OR LONG-TERM CARE | End: 2020-02-28
Payer: MEDICARE

## 2020-02-28 VITALS — BODY MASS INDEX: 33.47 KG/M2 | HEIGHT: 60.5 IN | WEIGHT: 175 LBS

## 2020-02-28 DIAGNOSIS — Z85.07: ICD-10-CM
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
## 2020-02-28 DIAGNOSIS — S06.0X0A: Primary | ICD-10-CM

## 2020-02-28 DIAGNOSIS — I10: ICD-10-CM

## 2020-02-28 PROCEDURE — 72125 CT NECK SPINE W/O DYE: CPT

## 2020-02-28 PROCEDURE — 70450 CT HEAD/BRAIN W/O DYE: CPT

## 2020-02-28 PROCEDURE — 99283 EMERGENCY DEPT VISIT LOW MDM: CPT

## 2020-02-28 NOTE — DIAGNOSTIC IMAGING REPORT
History: Fall, pain

Comparison studies:None



Technique:

Axial images were obtained from the brain and cervical spine.

Coronal and sagittal images reconstructed from the axial data.

Intravenous contrast: None

Dose modulation, iterative reconstruction, and/or weight based adjustment of

the mA/kV was utilized to reduce the radiation dose to as low as reasonably

achievable.



Findings:



Head CT:



Scalp/skull: 

No acute abnormalities. Thinning of the right parietal bone outer table. No

fractures, blastic or lytic lesions.



Brain sulci: Mildly prominent.

Ventricles: Mildly prominent. No hydrocephalus.



Extra-axial spaces: 

No masses.  No fluid collections.



Parenchyma: 

Scattered hypodensities in the supratentorial white matter are small vessel

ischemic changes. 

No masses, hemorrhage, acute or chronic cortical vascular insults.



Sellar/suprasellar region: No abnormalities.

Craniocervical junction: Patent foramen magnum.  No Chiari one malformation.



Right eye prostheses partially visualized.



Cervical spine CT:



Fractures: None.

Soft tissues: No gross abnormalities.



Atlantoaxial articulation: Intact.

Alignment: Normal lordosis. No scoliosis.

Cervicomedullary junction: No abnormalities. Patent foramen magnum.



Vertebrae: 

No infection or neoplasm.



Degenerative changes: 

Moderate left degenerative foraminal narrowing at C5-6. Disc degeneration with

decreased intervertebral space C5-6 and C6-7.



Incidental findings:

Right IJ central catheter partially visualized.



Impression:



Head CT:

1.  No acute abnormality.

2.  Mild generalized volume loss.

3.  Moderate supratentorial white matter small vessel ischemic changes.



Cervical spine CT:

1.  No acute abnormalities.

2.  Cannot exclude ligament, spinal cord and or vascular abnormalities on the

basis of this examination.



Signed by: DR José Luis Best M.D. on 2/28/2020 2:50 AM

## 2021-12-01 NOTE — NUR
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 SPOKE TO REBECCA
